# Patient Record
Sex: MALE | Race: WHITE | NOT HISPANIC OR LATINO | Employment: OTHER | URBAN - METROPOLITAN AREA
[De-identification: names, ages, dates, MRNs, and addresses within clinical notes are randomized per-mention and may not be internally consistent; named-entity substitution may affect disease eponyms.]

---

## 2017-02-20 ENCOUNTER — OFFICE VISIT (OUTPATIENT)
Dept: FAMILY MEDICINE | Facility: CLINIC | Age: 32
End: 2017-02-20
Payer: COMMERCIAL

## 2017-02-20 VITALS
TEMPERATURE: 97 F | HEART RATE: 108 BPM | HEIGHT: 70 IN | WEIGHT: 167 LBS | DIASTOLIC BLOOD PRESSURE: 82 MMHG | SYSTOLIC BLOOD PRESSURE: 133 MMHG | BODY MASS INDEX: 23.91 KG/M2

## 2017-02-20 DIAGNOSIS — B02.9 HERPES ZOSTER WITHOUT COMPLICATION: ICD-10-CM

## 2017-02-20 DIAGNOSIS — R10.9 RIGHT FLANK PAIN: Primary | ICD-10-CM

## 2017-02-20 DIAGNOSIS — Z00.01 ENCOUNTER FOR ROUTINE ADULT HEALTH EXAMINATION WITH ABNORMAL FINDINGS: ICD-10-CM

## 2017-02-20 LAB
ALBUMIN SERPL-MCNC: 4.4 G/DL (ref 3.4–5)
ALP SERPL-CCNC: 64 U/L (ref 40–150)
ALT SERPL W P-5'-P-CCNC: 20 U/L (ref 0–70)
ANION GAP SERPL CALCULATED.3IONS-SCNC: 7 MMOL/L (ref 3–14)
AST SERPL W P-5'-P-CCNC: 18 U/L (ref 0–45)
BILIRUB SERPL-MCNC: 4.1 MG/DL (ref 0.2–1.3)
BUN SERPL-MCNC: 17 MG/DL (ref 7–30)
CALCIUM SERPL-MCNC: 9.6 MG/DL (ref 8.5–10.1)
CHLORIDE SERPL-SCNC: 100 MMOL/L (ref 94–109)
CO2 SERPL-SCNC: 29 MMOL/L (ref 20–32)
CREAT SERPL-MCNC: 0.88 MG/DL (ref 0.66–1.25)
GFR SERPL CREATININE-BSD FRML MDRD: ABNORMAL ML/MIN/1.7M2
GLUCOSE SERPL-MCNC: 90 MG/DL (ref 70–99)
POTASSIUM SERPL-SCNC: 4.1 MMOL/L (ref 3.4–5.3)
PROT SERPL-MCNC: 7.9 G/DL (ref 6.8–8.8)
SODIUM SERPL-SCNC: 136 MMOL/L (ref 133–144)

## 2017-02-20 PROCEDURE — 80053 COMPREHEN METABOLIC PANEL: CPT | Performed by: FAMILY MEDICINE

## 2017-02-20 PROCEDURE — 99214 OFFICE O/P EST MOD 30 MIN: CPT | Mod: 25 | Performed by: FAMILY MEDICINE

## 2017-02-20 PROCEDURE — 99395 PREV VISIT EST AGE 18-39: CPT | Performed by: FAMILY MEDICINE

## 2017-02-20 PROCEDURE — 36415 COLL VENOUS BLD VENIPUNCTURE: CPT | Performed by: FAMILY MEDICINE

## 2017-02-20 RX ORDER — TRIAMCINOLONE ACETONIDE 1 MG/G
CREAM TOPICAL
Qty: 15 G | Refills: 0 | Status: SHIPPED | OUTPATIENT
Start: 2017-02-20 | End: 2017-12-12

## 2017-02-20 NOTE — PROGRESS NOTES
SUBJECTIVE:     CC: Keesha Norton is an 31 year old male who presents for preventative health visit.     Healthy Habits:    Do you get at least three servings of calcium containing foods daily (dairy, green leafy vegetables, etc.)? yes    Amount of exercise or daily activities, outside of work: 2 day(s) per week    Problems taking medications regularly No    Medication side effects: No    Have you had an eye exam in the past two years? no    Do you see a dentist twice per year? no    Do you have sleep apnea, excessive snoring or daytime drowsiness?no    Pt is having some type of blister in the right lower leg : now is like a scar    Abdominal/Flank Pain  Duration of complaint: 3 days ago   Description:   Character: Dull ache and Stabbing  Location: right upper quadrant right lower quadrant  Radiation: Back  Intensity: moderate  Progression of Symptoms:  worsening and constant  Accompanying Signs & Symptoms:  Fever/Chills: no  Gas/Bloating: YES  Nausea: YES  Vomitting: YES  Diarrhea: no  Dysuria or Hematuria: no  History:   Trauma: no  Previous similar pain: no   Previous tests done: none  Precipitating factors:   Does the pain change with:     Food: YES     BM: no    Urination: no  Alleviating factors: na  Therapies Tried and outcome: na  LMP:  not applicable    Blisters on right leg and right flank for 1 week.  Associated with runny nose, stuffy nose and cough.  No fever.    Today's PHQ-2 Score: 0  PHQ-2 ( 1999 Pfizer) 2/20/2017 2/19/2017   Q1: Little interest or pleasure in doing things 0 -   Q2: Feeling down, depressed or hopeless 0 -   PHQ-2 Score 0 -   Little interest or pleasure in doing things - Not at all   Feeling down, depressed or hopeless - Not at all   PHQ-2 Score - 0       Abuse: Current or Past(Physical, Sexual or Emotional)- No  Do you feel safe in your environment - Yes    Social History   Substance Use Topics     Smoking status: Never Smoker     Smokeless tobacco: Never Used     Alcohol use Yes  "     Comment: may once a month     The patient does not drink >3 drinks per day nor >7 drinks per week.    Last PSA: No results found for: PSA    Recent Labs   Lab Test  01/06/15   0750   CHOL  160   HDL  52   LDL  92   TRIG  82   CHOLHDLRATIO  3.1       Reviewed orders with patient. Reviewed health maintenance and updated orders accordingly - Yes    All Histories reviewed and updated in Epic.      ROS:  C: NEGATIVE for fever, chills, change in weight  E: NEGATIVE for vision changes or irritation  ENT: NEGATIVE for ear, mouth and throat problems  R: NEGATIVE for significant cough or SOB  CV: NEGATIVE for chest pain, palpitations or peripheral edema   male: negative for dysuria, hematuria, decreased urinary stream, erectile dysfunction, urethral discharge  M: NEGATIVE for significant arthralgias or myalgia  N: NEGATIVE for weakness, dizziness or paresthesias  P: NEGATIVE for changes in mood or affect    Problem list, Medication list, Allergies, and Medical/Social/Surgical histories reviewed in [facility] and updated as appropriate.  OBJECTIVE:     /82 (BP Location: Right arm, Patient Position: Chair, Cuff Size: Adult Regular)  Pulse 108  Temp 97  F (36.1  C) (Oral)  Ht 5' 10\" (1.778 m)  Wt 167 lb (75.8 kg)  BMI 23.96 kg/m2  EXAM:  GENERAL: healthy, alert and no distress  EYES: Eyes grossly normal to inspection, PERRL and conjunctivae and sclerae normal  HENT: ear canals and TM's normal, nose and mouth without ulcers or lesions  NECK: no adenopathy, no asymmetry, masses, or scars and thyroid normal to palpation  RESP: lungs clear to auscultation - no rales, rhonchi or wheezes  CV: regular rate and rhythm, normal S1 S2, no S3 or S4, no murmur, click or rub, no peripheral edema and peripheral pulses strong  ABDOMEN: tenderness LUQ and bowel sounds normal  MS: no gross musculoskeletal defects noted, no edema  SKIN: hyperpigmented macules with healed blisters on right calf  NEURO: Normal strength and tone, mentation " "intact and speech normal  PSYCH: mentation appears normal, affect normal/bright    ASSESSMENT/PLAN:     1. Right flank pain  Check lab and ultrasound.  Offered EGD but refused  - Comprehensive metabolic panel  - US Abdomen Complete; Future    2. Herpes zoster without complication  Healing - topical treatment and monitor for resolution.  - triamcinolone (KENALOG) 0.1 % cream; Apply sparingly to affected area three times daily for 14 days.  Dispense: 15 g; Refill: 0    3. Encounter for routine adult health examination with abnormal findings  Routine preventive discussed.      COUNSELING:  Reviewed preventive health counseling, as reflected in patient instructions    BP Screening:   Last 3 BP Readings:    BP Readings from Last 3 Encounters:   02/20/17 133/82   12/29/15 134/78   09/17/15 105/60       The following was recommended to the patient:  Re-screen BP within a year and recommended lifestyle modifications     reports that he has never smoked. He has never used smokeless tobacco.    Estimated body mass index is 23.96 kg/(m^2) as calculated from the following:    Height as of this encounter: 5' 10\" (1.778 m).    Weight as of this encounter: 167 lb (75.8 kg).       Counseling Resources:  ATP IV Guidelines  Pooled Cohorts Equation Calculator  FRAX Risk Assessment  ICSI Preventive Guidelines  Dietary Guidelines for Americans, 2010  USDA's MyPlate  ASA Prophylaxis  Lung CA Screening    Zainab Fish MD  [facility]  "

## 2017-02-20 NOTE — NURSING NOTE
"Chief Complaint   Patient presents with     Physical     Pt is fasting.        Initial /82 (BP Location: Right arm, Patient Position: Chair, Cuff Size: Adult Regular)  Pulse 108  Temp 97  F (36.1  C) (Oral)  Ht 5' 10\" (1.778 m)  Wt 167 lb (75.8 kg)  BMI 23.96 kg/m2 Estimated body mass index is 23.96 kg/(m^2) as calculated from the following:    Height as of this encounter: 5' 10\" (1.778 m).    Weight as of this encounter: 167 lb (75.8 kg).  Medication Reconciliation: complete   An,CMA      "

## 2017-02-20 NOTE — MR AVS SNAPSHOT
After Visit Summary   2/20/2017    Keesha Norton    MRN: [id number]           Patient Information     Date Of Birth          1985        Visit Information        Provider Department      2/20/2017 11:40 AM Zainab Handy MD [facility]        Today's Diagnoses     Right flank pain    -  1    Herpes zoster without complication          Care Instructions      Preventive Health Recommendations  Male Ages 26 - 39    Yearly exam:             See your health care provider every year in order to  o   Review health changes.   o   Discuss preventive care.    o   Review your medicines if your doctor has prescribed any.    You should be tested each year for STDs (sexually transmitted diseases), if you re at risk.     After age 35, talk to your provider about cholesterol testing. If you are at risk for heart disease, have your cholesterol tested at least every 5 years.     If you are at risk for diabetes, you should have a diabetes test (fasting glucose).  Shots: Get a flu shot each year. Get a tetanus shot every 10 years.     Nutrition:    Eat at least 5 servings of fruits and vegetables daily.     Eat whole-grain bread, whole-wheat pasta and brown rice instead of white grains and rice.     Talk to your provider about Calcium and Vitamin D.     Lifestyle    Exercise for at least 150 minutes a week (30 minutes a day, 5 days a week). This will help you control your weight and prevent disease.     Limit alcohol to one drink per day.     No smoking.     Wear sunscreen to prevent skin cancer.     See your dentist every six months for an exam and cleaning.           Follow-ups after your visit        Your next 10 appointments already scheduled     Feb 21, 2017  1:40 PM MAGED Bates Physical Adult with Lalo John MD   [facility] ([facility]    [address] [place] [id number] [phone]              Future  "tests that were ordered for you today     Open Future Orders        Priority Expected Expires Ordered    US Abdomen Complete Routine 5/21/2017 2/20/2018 2/20/2017            Who to contact     If you have questions or need follow up information about today's clinic visit or your schedule please contact [facility] directly at [phone].  Normal or non-critical lab and imaging results will be communicated to you by MyChart, letter or phone within 4 business days after the clinic has received the results. If you do not hear from us within 7 days, please contact the clinic through Fusion-iohart or phone. If you have a critical or abnormal lab result, we will notify you by phone as soon as possible.  Submit refill requests through Bugsnag or call your pharmacy and they will forward the refill request to us. Please allow 3 business days for your refill to be completed.          Additional Information About Your Visit        Fusion-iohart Information     Bugsnag gives you secure access to your electronic health record. If you see a primary care provider, you can also send messages to your care team and make appointments. If you have questions, please call your primary care clinic.  If you do not have a primary care provider, please call [phone] and they will assist you.        Care EveryWhere ID     This is your Care EveryWhere ID. This could be used by other organizations to access your [place] medical records  [id number]        Your Vitals Were     Pulse Temperature Height BMI (Body Mass Index)          108 97  F (36.1  C) (Oral) 5' 10\" (1.778 m) 23.96 kg/m2         Blood Pressure from Last 3 Encounters:   02/20/17 133/82   12/29/15 134/78   09/17/15 105/60    Weight from Last 3 Encounters:   02/20/17 167 lb (75.8 kg)   12/29/15 169 lb 14.4 oz (77.1 kg)   09/17/15 162 lb 8 oz (73.7 kg)              We Performed the Following     Comprehensive metabolic panel          Today's Medication Changes        "   These changes are accurate as of: 2/20/17 12:13 PM.  If you have any questions, ask your nurse or doctor.               Start taking these medicines.        Dose/Directions    triamcinolone 0.1 % cream   Commonly known as:  KENALOG   Used for:  Herpes zoster without complication   Started by:  Zainab Handy MD        Apply sparingly to affected area three times daily for 14 days.   Quantity:  15 g   Refills:  0            Where to get your medicines      These medications were sent to [facility] [place] - [address]  [address], [facility] [address]     Phone:  [phone]     triamcinolone 0.1 % cream                Primary Care Provider Office Phone # Fax #    Zainab Fish [phone]       [facility] [address]  [facility] [address]        Thank you!     Thank you for choosing [facility]  for your care. Our goal is always to provide you with excellent care. Hearing back from our patients is one way we can continue to improve our services. Please take a few minutes to complete the written survey that you may receive in the mail after your visit with us. Thank you!             Your Updated Medication List - Protect others around you: Learn how to safely use, store and throw away your medicines at www.disposemymeds.org.          This list is accurate as of: 2/20/17 12:13 PM.  Always use your most recent med list.                   Brand Name Dispense Instructions for use    triamcinolone 0.1 % cream    KENALOG    15 g    Apply sparingly to affected area three times daily for 14 days.

## 2017-02-21 ENCOUNTER — RADIANT APPOINTMENT (OUTPATIENT)
Dept: ULTRASOUND IMAGING | Facility: CLINIC | Age: 32
End: 2017-02-21
Attending: FAMILY MEDICINE
Payer: COMMERCIAL

## 2017-02-21 DIAGNOSIS — R10.9 RIGHT FLANK PAIN: ICD-10-CM

## 2017-02-21 PROCEDURE — 76700 US EXAM ABDOM COMPLETE: CPT

## 2017-02-21 NOTE — PROGRESS NOTES
Mr. Norton,    Your abdominal ultrasound was normal.  No signs of any liver issues.    Please contact the clinic if you have additional questions.  Thank you.    Sincerely,    Zainab Fish

## 2017-02-23 NOTE — PROGRESS NOTES
Mr. Norton,    Your liver and kidney tests are normal.    Please contact the clinic if you have additional questions.  Thank you.    Sincerely,    Zainab Fish

## 2017-11-07 ENCOUNTER — NURSE TRIAGE (OUTPATIENT)
Dept: NURSING | Facility: CLINIC | Age: 32
End: 2017-11-07

## 2017-11-08 NOTE — TELEPHONE ENCOUNTER
"\"AJ\" as he said his name was, stepped on a clinton nail.  He wonders if he needs a tetanus shot.  His last tetanus shot was in 2010. I instructed he get a tetanus shot within 72 hours of the injury.  He'll make an appt on line.  Mansi CANNON RN [facility]     "

## 2017-11-27 ENCOUNTER — OFFICE VISIT (OUTPATIENT)
Dept: URGENT CARE | Facility: URGENT CARE | Age: 32
End: 2017-11-27
Payer: COMMERCIAL

## 2017-11-27 VITALS
TEMPERATURE: 98.8 F | BODY MASS INDEX: 23.62 KG/M2 | OXYGEN SATURATION: 99 % | HEART RATE: 76 BPM | DIASTOLIC BLOOD PRESSURE: 72 MMHG | SYSTOLIC BLOOD PRESSURE: 121 MMHG | WEIGHT: 164.6 LBS

## 2017-11-27 DIAGNOSIS — Z23 NEED FOR VACCINATION: ICD-10-CM

## 2017-11-27 DIAGNOSIS — R82.90 CLOUDY URINE: ICD-10-CM

## 2017-11-27 DIAGNOSIS — M54.42 ACUTE LEFT-SIDED LOW BACK PAIN WITH LEFT-SIDED SCIATICA: Primary | ICD-10-CM

## 2017-11-27 LAB
ALBUMIN UR-MCNC: NEGATIVE MG/DL
APPEARANCE UR: CLEAR
BACTERIA #/AREA URNS HPF: ABNORMAL /HPF
BASOPHILS # BLD AUTO: 0 10E9/L (ref 0–0.2)
BASOPHILS NFR BLD AUTO: 0.1 %
BILIRUB UR QL STRIP: NEGATIVE
COLOR UR AUTO: YELLOW
DIFFERENTIAL METHOD BLD: NORMAL
EOSINOPHIL # BLD AUTO: 0.1 10E9/L (ref 0–0.7)
EOSINOPHIL NFR BLD AUTO: 1.5 %
ERYTHROCYTE [DISTWIDTH] IN BLOOD BY AUTOMATED COUNT: 12.4 % (ref 10–15)
GLUCOSE BLD-MCNC: 73 MG/DL (ref 70–99)
GLUCOSE UR STRIP-MCNC: NEGATIVE MG/DL
HCT VFR BLD AUTO: 44.6 % (ref 40–53)
HGB BLD-MCNC: 15.6 G/DL (ref 13.3–17.7)
HGB UR QL STRIP: ABNORMAL
KETONES UR STRIP-MCNC: NEGATIVE MG/DL
LEUKOCYTE ESTERASE UR QL STRIP: NEGATIVE
LYMPHOCYTES # BLD AUTO: 1.9 10E9/L (ref 0.8–5.3)
LYMPHOCYTES NFR BLD AUTO: 25.4 %
MCH RBC QN AUTO: 30.5 PG (ref 26.5–33)
MCHC RBC AUTO-ENTMCNC: 35 G/DL (ref 31.5–36.5)
MCV RBC AUTO: 87 FL (ref 78–100)
MONOCYTES # BLD AUTO: 0.6 10E9/L (ref 0–1.3)
MONOCYTES NFR BLD AUTO: 8.3 %
NEUTROPHILS # BLD AUTO: 4.9 10E9/L (ref 1.6–8.3)
NEUTROPHILS NFR BLD AUTO: 64.7 %
NITRATE UR QL: NEGATIVE
PH UR STRIP: 6 PH (ref 5–7)
PLATELET # BLD AUTO: 232 10E9/L (ref 150–450)
RBC # BLD AUTO: 5.12 10E12/L (ref 4.4–5.9)
RBC #/AREA URNS AUTO: ABNORMAL /HPF
SOURCE: ABNORMAL
SP GR UR STRIP: <=1.005 (ref 1–1.03)
UROBILINOGEN UR STRIP-ACNC: 0.2 EU/DL (ref 0.2–1)
WBC # BLD AUTO: 7.5 10E9/L (ref 4–11)
WBC #/AREA URNS AUTO: ABNORMAL /HPF

## 2017-11-27 PROCEDURE — 81001 URINALYSIS AUTO W/SCOPE: CPT | Performed by: PHYSICIAN ASSISTANT

## 2017-11-27 PROCEDURE — 90471 IMMUNIZATION ADMIN: CPT | Performed by: PHYSICIAN ASSISTANT

## 2017-11-27 PROCEDURE — 90715 TDAP VACCINE 7 YRS/> IM: CPT | Performed by: PHYSICIAN ASSISTANT

## 2017-11-27 PROCEDURE — 82947 ASSAY GLUCOSE BLOOD QUANT: CPT | Mod: 59 | Performed by: PHYSICIAN ASSISTANT

## 2017-11-27 PROCEDURE — 80048 BASIC METABOLIC PNL TOTAL CA: CPT | Performed by: PHYSICIAN ASSISTANT

## 2017-11-27 PROCEDURE — 85025 COMPLETE CBC W/AUTO DIFF WBC: CPT | Performed by: PHYSICIAN ASSISTANT

## 2017-11-27 PROCEDURE — 99214 OFFICE O/P EST MOD 30 MIN: CPT | Mod: 25 | Performed by: PHYSICIAN ASSISTANT

## 2017-11-27 PROCEDURE — 87086 URINE CULTURE/COLONY COUNT: CPT | Performed by: PHYSICIAN ASSISTANT

## 2017-11-27 PROCEDURE — 36415 COLL VENOUS BLD VENIPUNCTURE: CPT | Performed by: PHYSICIAN ASSISTANT

## 2017-11-27 RX ORDER — CIPROFLOXACIN 250 MG/1
250 TABLET, FILM COATED ORAL 2 TIMES DAILY
Qty: 6 TABLET | Refills: 0 | Status: SHIPPED | OUTPATIENT
Start: 2017-11-27 | End: 2017-12-12

## 2017-11-27 NOTE — MR AVS SNAPSHOT
After Visit Summary   11/27/2017    Keesha Norton    MRN: [id number]           Patient Information     Date Of Birth          1985        Visit Information        Provider Department      11/27/2017 5:00 PM [facility]        Today's Diagnoses     Acute left-sided low back pain with left-sided sciatica    -  1    Cloudy urine        Need for vaccination           Follow-ups after your visit        Who to contact     If you have questions or need follow up information about today's clinic visit or your schedule please contact [facility] directly at [phone].  Normal or non-critical lab and imaging results will be communicated to you by MyChart, letter or phone within 4 business days after the clinic has received the results. If you do not hear from us within 7 days, please contact the clinic through [employer] Continuityhart or phone. If you have a critical or abnormal lab result, we will notify you by phone as soon as possible.  Submit refill requests through OutTrippin or call your pharmacy and they will forward the refill request to us. Please allow 3 business days for your refill to be completed.          Additional Information About Your Visit        MyChart Information     OutTrippin gives you secure access to your electronic health record. If you see a primary care provider, you can also send messages to your care team and make appointments. If you have questions, please call your primary care clinic.  If you do not have a primary care provider, please call [phone] and they will assist you.        Care EveryWhere ID     This is your Care EveryWhere ID. This could be used by other organizations to access your [place] medical records  [id number]        Your Vitals Were     Pulse Temperature Pulse Oximetry BMI (Body Mass Index)          76 98.8  F (37.1  C) (Oral) 99% 23.62 kg/m2         Blood Pressure from Last 3 Encounters:   11/27/17  121/72   02/20/17 133/82   12/29/15 134/78    Weight from Last 3 Encounters:   11/27/17 164 lb 9.6 oz (74.7 kg)   02/20/17 167 lb (75.8 kg)   12/29/15 169 lb 14.4 oz (77.1 kg)              We Performed the Following     *UA reflex to Microscopic and Culture ([place] and [facility] (except [place] and [place])     Basic metabolic panel  (Ca, Cl, CO2, Creat, Gluc, K, Na, BUN)     CBC with platelets differential     Glucose, whole blood     TDAP VACCINE (ADACEL)     Urine Culture Aerobic Bacterial     Urine Microscopic          Today's Medication Changes          These changes are accurate as of: 11/27/17  7:25 PM.  If you have any questions, ask your nurse or doctor.               Start taking these medicines.        Dose/Directions    ciprofloxacin 250 MG tablet   Commonly known as:  CIPRO   Used for:  Cloudy urine   Started by:  Sue Law PA-C        Dose:  250 mg   Take 1 tablet (250 mg) by mouth 2 times daily   Quantity:  6 tablet   Refills:  0            Where to get your medicines      These medications were sent to [facility] [place] - [place], [place] - [address]  [address], [facility] [address]     Phone:  [phone]     ciprofloxacin 250 MG tablet                Primary Care Provider Office Phone # Fax #    Zainab Lrbalaji Fish [phone]       [address]  [facility] [address]        Equal Access to Services     [facility] : Hadii aad ku hadasho Soomaali, waaxda luqadaha, qaybta kaalmada adeegyada, jeffery ramirez hayjonna hawley . So [facility] [phone].    ATENCIÓN: Si habla español, tiene a [place] disposición servicios gratuitos de asistencia lingüística. Llame al [phone].    We comply with applicable federal civil rights laws and [place] laws. We do not discriminate on the basis of race, color, national origin, age, disability, sex, sexual orientation, or gender identity.            Thank you!     Thank  you for choosing [facility]  for your care. Our goal is always to provide you with excellent care. Hearing back from our patients is one way we can continue to improve our services. Please take a few minutes to complete the written survey that you may receive in the mail after your visit with us. Thank you!             Your Updated Medication List - Protect others around you: Learn how to safely use, store and throw away your medicines at www.disposemymeds.org.          This list is accurate as of: 11/27/17  7:25 PM.  Always use your most recent med list.                   Brand Name Dispense Instructions for use Diagnosis    ciprofloxacin 250 MG tablet    CIPRO    6 tablet    Take 1 tablet (250 mg) by mouth 2 times daily    Cloudy urine       triamcinolone 0.1 % cream    KENALOG    15 g    Apply sparingly to affected area three times daily for 14 days.    Herpes zoster without complication

## 2017-11-27 NOTE — PROGRESS NOTES
SUBJECTIVE:   Keesha Norton is a 32 year old male who presents to clinic today for the following health issues:      Back pain      Duration: 3-4 days    Description (location/character/radiation): lower back    Intensity:  moderate    Accompanying signs and symptoms: back pain, dizziness, cloudy urine    History (similar episodes/previous evaluation): None    Precipitating or alleviating factors: None    Therapies tried and outcome: cranberry juice   No injury   Also stepped on a nail 20 days ago, r big toe. Healed but worried about tetanus. Last tetanus 2010  Left LBP with some tingling in left thigh.  Dizziness when back pain occurs. Has tried to drink lots of water. No dysuria. . No STD concerns        Allergies   Allergen Reactions     Seasonal Allergies        Past Medical History:   Diagnosis Date     Gastric ulcer 2015     Helicobacter pylori (H. pylori) infection          Current Outpatient Prescriptions on File Prior to Visit:  triamcinolone (KENALOG) 0.1 % cream Apply sparingly to affected area three times daily for 14 days.     No current facility-administered medications on file prior to visit.     Social History   Substance Use Topics     Smoking status: Never Smoker     Smokeless tobacco: Never Used     Alcohol use Yes      Comment: may once a month       ROS:  General: negative for fever  ABD: Denies abd pain  : as above  No HA.    OBJECTIVE:  /72 (BP Location: Left arm, Patient Position: Chair, Cuff Size: Adult Regular)  Pulse 76  Temp 98.8  F (37.1  C) (Oral)  Wt 164 lb 9.6 oz (74.7 kg)  SpO2 99%  BMI 23.62 kg/m2   General:   awake, alert, and cooperative.  NAD.   Head: Normocephalic, atraumatic.  Eyes: Conjunctiva clear, non icteric.   ABD: soft, no tenderness to palpation , no rigidity, guarding or rebound . No CVAT  Neuro: Alert and oriented - normal speech.   Back- NT. FROM without pain. Reflexes LE symmetric and physiologic.  Supine SLR- Negative bilaterally. Neg.  Raleigh's test bilaterally.  Results for orders placed or performed in visit on 11/27/17   *UA reflex to Microscopic and Culture ([place] and [facility] (except [place] and [place])   Result Value Ref Range    Color Urine Yellow     Appearance Urine Clear     Glucose Urine Negative NEG^Negative mg/dL    Bilirubin Urine Negative NEG^Negative    Ketones Urine Negative NEG^Negative mg/dL    Specific Gravity Urine <=1.005 1.003 - 1.035    Blood Urine Trace (A) NEG^Negative    pH Urine 6.0 5.0 - 7.0 pH    Protein Albumin Urine Negative NEG^Negative mg/dL    Urobilinogen Urine 0.2 0.2 - 1.0 EU/dL    Nitrite Urine Negative NEG^Negative    Leukocyte Esterase Urine Negative NEG^Negative    Source Midstream Urine    CBC with platelets differential   Result Value Ref Range    WBC 7.5 4.0 - 11.0 10e9/L    RBC Count 5.12 4.4 - 5.9 10e12/L    Hemoglobin 15.6 13.3 - 17.7 g/dL    Hematocrit 44.6 40.0 - 53.0 %    MCV 87 78 - 100 fl    MCH 30.5 26.5 - 33.0 pg    MCHC 35.0 31.5 - 36.5 g/dL    RDW 12.4 10.0 - 15.0 %    Platelet Count 232 150 - 450 10e9/L    Diff Method Automated Method     % Neutrophils 64.7 %    % Lymphocytes 25.4 %    % Monocytes 8.3 %    % Eosinophils 1.5 %    % Basophils 0.1 %    Absolute Neutrophil 4.9 1.6 - 8.3 10e9/L    Absolute Lymphocytes 1.9 0.8 - 5.3 10e9/L    Absolute Monocytes 0.6 0.0 - 1.3 10e9/L    Absolute Eosinophils 0.1 0.0 - 0.7 10e9/L    Absolute Basophils 0.0 0.0 - 0.2 10e9/L   Glucose, whole blood   Result Value Ref Range    Glucose Whole Blood 73 70 - 99 mg/dL   Urine Microscopic   Result Value Ref Range    WBC Urine O - 2 OTO2^O - 2 /HPF    RBC Urine O - 2 OTO2^O - 2 /HPF    Bacteria Urine Few (A) NEG^Negative /HPF         ASSESSMENT:    ICD-10-CM    1. Acute left-sided low back pain with left-sided sciatica M54.42 Basic metabolic panel  (Ca, Cl, CO2, Creat, Gluc, K, Na, BUN)     CBC with platelets differential     Glucose, whole blood     Urine Microscopic   2. Cloudy urine R82.90 *UA  reflex to Microscopic and Culture ([place] and [facility] (except [place] and [place])     Glucose, whole blood     Urine Culture Aerobic Bacterial     ciprofloxacin (CIPRO) 250 MG tablet   3. Need for vaccination Z23 TDAP VACCINE (ADACEL)           PLAN: Unclear etiology. If kidney unction is normal try OTC IBU. Urine showed a few bacteria. ? Urethritis. Will try some Cipro. UC pending.  As per ordered above.   Sue Law PA-C

## 2017-11-27 NOTE — NURSING NOTE
"Chief Complaint   Patient presents with     Back Pain     Patient complains of back pain       Initial /72 (BP Location: Left arm, Patient Position: Chair, Cuff Size: Adult Regular)  Pulse 76  Temp 98.8  F (37.1  C) (Oral)  Wt 164 lb 9.6 oz (74.7 kg)  SpO2 99%  BMI 23.62 kg/m2 Estimated body mass index is 23.62 kg/(m^2) as calculated from the following:    Height as of 2/20/17: 5' 10\" (1.778 m).    Weight as of this encounter: 164 lb 9.6 oz (74.7 kg).  Medication Reconciliation: complete       No Barraza    "

## 2017-11-27 NOTE — LETTER
[facility]  [address] [facility] [address]  Phone: [phone]   November 29, 2017      Keesha Norton  [address] [place] [id number]            Dear Keesha Norton    Urine culture showed no growth. Follow up with Primary if no resolution of symptoms.    Enclosed is a copy of the results.  It was a pleasure to see you at your last visit.    If you have any questions or concerns, please call the clinic at [phone].      Sincerely,      Sue Law PA-C/ ORLY BORDEN      Results for orders placed or performed in visit on 11/27/17   *UA reflex to Microscopic and Culture ([facility] (except [place] and [place])   Result Value Ref Range    Color Urine Yellow     Appearance Urine Clear     Glucose Urine Negative NEG^Negative mg/dL    Bilirubin Urine Negative NEG^Negative    Ketones Urine Negative NEG^Negative mg/dL    Specific Gravity Urine <=1.005 1.003 - 1.035    Blood Urine Trace (A) NEG^Negative    pH Urine 6.0 5.0 - 7.0 pH    Protein Albumin Urine Negative NEG^Negative mg/dL    Urobilinogen Urine 0.2 0.2 - 1.0 EU/dL    Nitrite Urine Negative NEG^Negative    Leukocyte Esterase Urine Negative NEG^Negative    Source Midstream Urine    Basic metabolic panel  (Ca, Cl, CO2, Creat, Gluc, K, Na, BUN)   Result Value Ref Range    Sodium 139 133 - 144 mmol/L    Potassium 4.0 3.4 - 5.3 mmol/L    Chloride 104 94 - 109 mmol/L    Carbon Dioxide 30 20 - 32 mmol/L    Anion Gap 5 3 - 14 mmol/L    Glucose 78 70 - 99 mg/dL    Urea Nitrogen 15 7 - 30 mg/dL    Creatinine 0.90 0.66 - 1.25 mg/dL    GFR Estimate >90 >60 mL/min/1.7m2    GFR Estimate If Black >90 >60 mL/min/1.7m2    Calcium 9.3 8.5 - 10.1 mg/dL   CBC with platelets differential   Result Value Ref Range    WBC 7.5 4.0 - 11.0 10e9/L    RBC Count 5.12 4.4 - 5.9 10e12/L    Hemoglobin 15.6 13.3 - 17.7 g/dL    Hematocrit 44.6 40.0 - 53.0 %    MCV 87 78 - 100 fl    MCH 30.5 26.5 - 33.0 pg    MCHC 35.0  31.5 - 36.5 g/dL    RDW 12.4 10.0 - 15.0 %    Platelet Count 232 150 - 450 10e9/L    Diff Method Automated Method     % Neutrophils 64.7 %    % Lymphocytes 25.4 %    % Monocytes 8.3 %    % Eosinophils 1.5 %    % Basophils 0.1 %    Absolute Neutrophil 4.9 1.6 - 8.3 10e9/L    Absolute Lymphocytes 1.9 0.8 - 5.3 10e9/L    Absolute Monocytes 0.6 0.0 - 1.3 10e9/L    Absolute Eosinophils 0.1 0.0 - 0.7 10e9/L    Absolute Basophils 0.0 0.0 - 0.2 10e9/L   Glucose, whole blood   Result Value Ref Range    Glucose Whole Blood 73 70 - 99 mg/dL   Urine Microscopic   Result Value Ref Range    WBC Urine O - 2 OTO2^O - 2 /HPF    RBC Urine O - 2 OTO2^O - 2 /HPF    Bacteria Urine Few (A) NEG^Negative /HPF   Urine Culture Aerobic Bacterial   Result Value Ref Range    Specimen Description Midstream Urine     Culture Micro No growth

## 2017-11-28 LAB
ANION GAP SERPL CALCULATED.3IONS-SCNC: 5 MMOL/L (ref 3–14)
BACTERIA SPEC CULT: NO GROWTH
BUN SERPL-MCNC: 15 MG/DL (ref 7–30)
CALCIUM SERPL-MCNC: 9.3 MG/DL (ref 8.5–10.1)
CHLORIDE SERPL-SCNC: 104 MMOL/L (ref 94–109)
CO2 SERPL-SCNC: 30 MMOL/L (ref 20–32)
CREAT SERPL-MCNC: 0.9 MG/DL (ref 0.66–1.25)
GFR SERPL CREATININE-BSD FRML MDRD: >90 ML/MIN/1.7M2
GLUCOSE SERPL-MCNC: 78 MG/DL (ref 70–99)
POTASSIUM SERPL-SCNC: 4 MMOL/L (ref 3.4–5.3)
SODIUM SERPL-SCNC: 139 MMOL/L (ref 133–144)
SPECIMEN SOURCE: NORMAL

## 2017-11-28 NOTE — NURSING NOTE
The following medication was given:     MEDICATION: TETANUS  ROUTE: IM  SITE: Arm - Right  DOSE: 0.5 ML  LOT #: [id number]  :  [employer]  EXPIRATION DATE:  09/22/2019  NDC#: 26601-941-93      No Barraza

## 2017-11-29 ENCOUNTER — TELEPHONE (OUTPATIENT)
Dept: URGENT CARE | Facility: URGENT CARE | Age: 32
End: 2017-11-29

## 2017-11-29 NOTE — TELEPHONE ENCOUNTER
Reason for Call:  Request for results:    Name of test or procedure: Urine labs    Date of test of procedure: 11/27/2017    Location of the test or procedure: [facility]    OK to leave the result message on voice mail or with a family member? YES    Phone number Patient can be reached at:  Home number on file [phone] (home)    Additional comments: none     Call taken on 11/29/2017 at 9:26 AM by Maureen May

## 2017-11-29 NOTE — TELEPHONE ENCOUNTER
Spoke with patient. Relayed results. Verbalized understanding.    Sheila Bragg RN   [facility]

## 2017-12-12 ENCOUNTER — OFFICE VISIT (OUTPATIENT)
Dept: FAMILY MEDICINE | Facility: CLINIC | Age: 32
End: 2017-12-12
Payer: COMMERCIAL

## 2017-12-12 VITALS
TEMPERATURE: 97.8 F | WEIGHT: 158.3 LBS | SYSTOLIC BLOOD PRESSURE: 113 MMHG | HEART RATE: 64 BPM | BODY MASS INDEX: 22.71 KG/M2 | DIASTOLIC BLOOD PRESSURE: 69 MMHG

## 2017-12-12 DIAGNOSIS — N45.1 EPIDIDYMITIS: Primary | ICD-10-CM

## 2017-12-12 DIAGNOSIS — K21.9 GASTROESOPHAGEAL REFLUX DISEASE WITHOUT ESOPHAGITIS: ICD-10-CM

## 2017-12-12 LAB
ALBUMIN UR-MCNC: NEGATIVE MG/DL
APPEARANCE UR: CLEAR
BILIRUB UR QL STRIP: NEGATIVE
COLOR UR AUTO: YELLOW
GLUCOSE UR STRIP-MCNC: NEGATIVE MG/DL
HGB UR QL STRIP: ABNORMAL
KETONES UR STRIP-MCNC: NEGATIVE MG/DL
LEUKOCYTE ESTERASE UR QL STRIP: NEGATIVE
MUCOUS THREADS #/AREA URNS LPF: PRESENT /LPF
NITRATE UR QL: NEGATIVE
NON-SQ EPI CELLS #/AREA URNS LPF: ABNORMAL /LPF
PH UR STRIP: 6 PH (ref 5–7)
RBC #/AREA URNS AUTO: ABNORMAL /HPF
SOURCE: ABNORMAL
SP GR UR STRIP: 1.02 (ref 1–1.03)
UROBILINOGEN UR STRIP-ACNC: 0.2 EU/DL (ref 0.2–1)
WBC #/AREA URNS AUTO: ABNORMAL /HPF

## 2017-12-12 PROCEDURE — 99214 OFFICE O/P EST MOD 30 MIN: CPT | Performed by: PHYSICIAN ASSISTANT

## 2017-12-12 PROCEDURE — 81001 URINALYSIS AUTO W/SCOPE: CPT | Performed by: PHYSICIAN ASSISTANT

## 2017-12-12 PROCEDURE — 87491 CHLMYD TRACH DNA AMP PROBE: CPT | Performed by: PHYSICIAN ASSISTANT

## 2017-12-12 PROCEDURE — 87591 N.GONORRHOEAE DNA AMP PROB: CPT | Performed by: PHYSICIAN ASSISTANT

## 2017-12-12 RX ORDER — SULFAMETHOXAZOLE/TRIMETHOPRIM 800-160 MG
1 TABLET ORAL 2 TIMES DAILY
Qty: 28 TABLET | Refills: 0 | Status: SHIPPED | OUTPATIENT
Start: 2017-12-12 | End: 2017-12-26

## 2017-12-12 NOTE — PATIENT INSTRUCTIONS
Please take Bactrim 1 tablet twice a day  twice a day with large amount of water  Increase fluids    Zantac 150 mg twice a day as needed for burning in stomach after eating  Discharge Instructions for Epididymitis  You have been diagnosed with epididymitis. This is an inflammation of a coiled tube called the epididymis that is located behind your testicle, inside the scrotum. The epididymis collects and stores sperm made by the testicles. Epididymitis is often caused by bacteria in the urinary tract or by bacteria passed between partners during sex. It can also be a complication of certain hospital procedures, or it can be caused by use of a urinary catheter. Here s what you need to do at home to care for yourself.  Home care    Be sure to finish all the medicine your healthcare provider prescribed -- even if you feel better. Not finishing the medicine can make your condition harder to treat or cause the condition to come back.    Rest in bed if you are uncomfortable. Discomfort should go away within 1 to 3 days of treatment. Swelling may persist for up to 2 months.    Ask your healthcare provider about pain medicine to keep you comfortable.    Use an ice pack or bag of frozen peas to help relieve the pain. Wrap the peas or ice pack in a thin cloth and apply to the area.    Don t leave the ice pack on your skin for longer than 20 minutes, and don t use it more often than once every hour.    Elevate the scrotum with a rolled-up towel when you are resting.    For the first few days, wear an athletic supporter. When your pain subsides, wear briefs instead of boxers to better support the scrotum. This can help relieve pain.    Keep your penis and scrotum clean.    Use a condom to protect against sexually transmitted diseases (STDs).    If your condition was caused by an STD, be sure to inform your sexual partner(s).  Follow-up care  Make a follow-up appointment or as directed by your healthcare provider.  When to call  your healthcare provider  Call your healthcare provider right away if you have any of the following:    Increased pain or swelling in the scrotum    Frequent urge or inability to urinate    Discharge from the penis    Pain during ejaculation    Fever above 100.4 F (38 C)   Date Last Reviewed: 1/1/2017 2000-2017 [employer]. [address] [place] [address]. All rights reserved. This information is not intended as a substitute for professional medical care. Always follow your healthcare professional's instructions.        What are Epididymitis and Orchitis?    The epididymis is a coiled tube. It is part of the male reproductive system. A man s two testicles sit inside the scrotum. One epididymis sits behind each testicle. Epididymitis is inflammation (swelling and irritation) of this tube. Orchitis occurs when the inflammation spreads to the testicle.  Normal flow of sperm and urine  Sperm are made in the testicles. Sperm travel from the testicles through the epididymis. They flow into a tube called the vas deferens. During ejaculation, sperm pass from the vas deferens through the urethra out of the body. During urination, urine flows from the bladder through the urethra out of the body.  How the problem starts  The problem is thought to be due to bacteria. If bacteria get into the urethra, they may cause an infection. Infection can then travel up the urethra into the epididymis. This leads to inflammation. Pain and swelling can then spread to the testicle. This is called orchitis. In rare cases, orchitis can be due to an infection with the mumps virus. There are two kinds of inflammation:    Acute inflammation comes on quickly. Symptoms may include:    Pain and swelling in the scrotum    Frequent urge to urinate    Discharge from the penis    Pain during ejaculation    Fever    Chronic inflammation is most often the late phase of an acute infection. Symptoms may include:    An ache or dull  pain in the scrotum, which may spread to the groin    A heavy feeling in the scrotum  Date Last Reviewed: 1/1/2017 2000-2017 [employer]. [address] [place] [address]. All rights reserved. This information is not intended as a substitute for professional medical care. Always follow your healthcare professional's instructions.        Tips to Control Acid Reflux    To control acid reflux, you ll need to make some basic diet and lifestyle changes. The simple steps outlined below may be all you ll need to ease discomfort.  Watch what you eat    Avoid fatty foods and spicy foods.    Eat fewer acidic foods, such as citrus and tomato-based foods. These can increase symptoms.    Limit drinking alcohol, caffeine, and fizzy beverages. All increase acid reflux.    Try limiting chocolate, peppermint, and spearmint. These can worsen acid reflux in some people.  Watch when you eat    Avoid lying down for 3 hours after eating.    Do not snack before going to bed.  Raise your head  Raising your head and upper body by 4 to 6 inches helps limit reflux when you re lying down. Put blocks under the head of your bed frame to raise it.  Other changes    Lose weight, if you need to    Don t exercise near bedtime    Avoid tight-fitting clothes    Limit aspirin and ibuprofen    Stop smoking   Date Last Reviewed: 7/1/2016 2000-2017 [employer]. [address] [place] [address]. All rights reserved. This information is not intended as a substitute for professional medical care. Always follow your healthcare professional's instructions.

## 2017-12-12 NOTE — NURSING NOTE
"Chief Complaint   Patient presents with     Back Pain     Lower back pain for about 3.5 weeks       Initial /69 (BP Location: Right arm, Patient Position: Chair, Cuff Size: Adult Regular)  Pulse 64  Temp 97.8  F (36.6  C) (Oral)  Wt 158 lb 4.8 oz (71.8 kg)  BMI 22.71 kg/m2 Estimated body mass index is 22.71 kg/(m^2) as calculated from the following:    Height as of 2/20/17: 5' 10\" (1.778 m).    Weight as of this encounter: 158 lb 4.8 oz (71.8 kg).  Medication Reconciliation: complete   Terri Mireles MA      "

## 2017-12-12 NOTE — PROGRESS NOTES
SUBJECTIVE:   Keesha Norton is a 32 year old male who presents to clinic today for the following health issues:    Genitourinary symptoms      Duration: 3 weeks    Description:  dysuria and pain in testicle    Intensity:  mild    Accompanying signs and symptoms (fever/discharge/nausea/vomiting/back or abdominal pain):  occasional burning pain that comes from groin and up on left side of the stomach and flank    History (frequent UTI's/kidney stones/prostate problems): None  Sexually active: YES    Precipitating or alleviating factors: None    Therapies tried and outcome: Cipro for 3 days 2 weeks ago   Outcome: helped slightly    GERD/Heartburn      Duration: on and off    Description (location/character/radiation): epigastric burning from acidic foods: orange or cranberry juice    Intensity:  mild    Accompanying signs and symptoms:  food getting stuck: no   nausea/vomiting/blood: no   abdominal pain: no   black/tarry or bloody stools: no :    History (similar episodes/previous evaluation): None    Precipitating or alleviating factors:  worse with spicy foods and acidic food.  current NSAID/Aspirin use: no     Therapies tried and outcome: none      Problem list and histories reviewed & adjusted, as indicated.  Additional history: as documented    Patient Active Problem List   Diagnosis     CARDIOVASCULAR SCREENING; LDL GOAL LESS THAN 160     Vitamin D insufficiency     Elevated bilirubin     GERD (gastroesophageal reflux disease)     Gilbert's disease     Herpes zoster without complication     Right flank pain     Past Surgical History:   Procedure Laterality Date     ESOPHAGOSCOPY, GASTROSCOPY, DUODENOSCOPY (EGD), COMBINED N/A 2/13/2015    Procedure: COMBINED ESOPHAGOSCOPY, GASTROSCOPY, DUODENOSCOPY (EGD);  Surgeon: Jair Dickinson MD;  Location:  OR     ESOPHAGOSCOPY, GASTROSCOPY, DUODENOSCOPY (EGD), COMBINED Left 2/13/2015    Procedure: COMBINED ESOPHAGOSCOPY, GASTROSCOPY, DUODENOSCOPY (EGD),  BIOPSY SINGLE OR MULTIPLE;  Surgeon: Jair Dickinson MD;  Location: [facility] OR     ESOPHAGOSCOPY, GASTROSCOPY, DUODENOSCOPY (EGD), COMBINED N/A 4/6/2015    Procedure: COMBINED ESOPHAGOSCOPY, GASTROSCOPY, DUODENOSCOPY (EGD);  Surgeon: Arsen Bashir MD;  Location: [facility] OR     HC TOOTH EXTRACTION W/FORCEP         Social History   Substance Use Topics     Smoking status: Never Smoker     Smokeless tobacco: Never Used     Alcohol use Yes      Comment: may once a month     Family History   Problem Relation Age of Onset     Hypertension Mother      Prostate Cancer Maternal Grandfather      Coronary Artery Disease Maternal Grandfather      CEREBROVASCULAR DISEASE Paternal Grandmother      Asthma Maternal Uncle      DIABETES Paternal Aunt      Cancer - colorectal No family hx of      Breast Cancer No family hx of      Thyroid Disease No family hx of      CANCER No family hx of      Glaucoma No family hx of      Macular Degeneration No family hx of      Hyperlipidemia No family hx of      Ovarian Cancer No family hx of      Thyroid Disease No family hx of          Current Outpatient Prescriptions   Medication Sig Dispense Refill     sulfamethoxazole-trimethoprim (BACTRIM DS/SEPTRA DS) 800-160 MG per tablet Take 1 tablet by mouth 2 times daily for 14 days 28 tablet 0     ranitidine (ZANTAC) 150 MG tablet Take 1 tablet (150 mg) by mouth 2 times daily 60 tablet 1     Allergies   Allergen Reactions     Seasonal Allergies          Reviewed and updated as needed this visit by clinical staffTobacco  Allergies  Meds  Med Hx  Surg Hx  Soc Hx      Reviewed and updated as needed this visit by Provider         ROS:  Constitutional, HEENT, cardiovascular, pulmonary, GI, , musculoskeletal, neuro, skin, endocrine and psych systems are negative, except as otherwise noted.      OBJECTIVE:   /69 (BP Location: Right arm, Patient Position: Chair, Cuff Size: Adult Regular)  Pulse 64  Temp 97.8  F (36.6  C) (Oral)  Wt 158 lb  4.8 oz (71.8 kg)  BMI 22.71 kg/m2  Body mass index is 22.71 kg/(m^2).  GENERAL: healthy, alert and no distress  EYES: Eyes grossly normal to inspection, PERRL and conjunctivae and sclerae normal  HENT: ear canals and TM's normal, nose and mouth without ulcers or lesions  NECK: no adenopathy, no asymmetry, masses, or scars and thyroid normal to palpation  RESP: lungs clear to auscultation - no rales, rhonchi or wheezes  CV: regular rate and rhythm, normal S1 S2, no S3 or S4, no murmur, click or rub, no peripheral edema and peripheral pulses strong  ABDOMEN: soft, nontender, no hepatosplenomegaly, no masses and bowel sounds normal   (male): testicles normal without atrophy or masses, no hernias, penis normal without urethral discharge and tenderness to palpation left epididymis  MS: no gross musculoskeletal defects noted, no edema  BACK: no CVA tenderness, no paralumbar tenderness    Diagnostic Test Results:  No results found for this or any previous visit (from the past 24 hour(s)).    ASSESSMENT/PLAN:       ICD-10-CM    1. Epididymitis N45.1 sulfamethoxazole-trimethoprim (BACTRIM DS/SEPTRA DS) 800-160 MG per tablet     UA with Microscopic     NEISSERIA GONORRHOEA PCR     CHLAMYDIA TRACHOMATIS PCR   2. Gastroesophageal reflux disease without esophagitis K21.9 ranitidine (ZANTAC) 150 MG tablet     1. Since Cipro has helped 2 weeks ago, but caused muscle and tendon pain in arms and legs patient will be switched to Bactrim  Please take Bactrim 1 tablet twice a day  twice a day with large amount of water  Increase fluids  STDs are pending   urinalysis is pending.   2. Zantac 150 mg twice a day as needed for burning in stomach after eating      Kasia Tillman PA-C  [facility]

## 2017-12-12 NOTE — MR AVS SNAPSHOT
After Visit Summary   12/12/2017    Keesha Norton    MRN: [id number]           Patient Information     Date Of Birth          1985        Visit Information        Provider Department      12/12/2017 7:20 AM Kasia Tillman PA-C [facility]        Today's Diagnoses     Epididymitis    -  1    Gastroesophageal reflux disease without esophagitis          Care Instructions    Please take Bactrim 1 tablet twice a day  twice a day with large amount of water  Increase fluids    Zantac 150 mg twice a day as needed for burning in stomach after eating  Discharge Instructions for Epididymitis  You have been diagnosed with epididymitis. This is an inflammation of a coiled tube called the epididymis that is located behind your testicle, inside the scrotum. The epididymis collects and stores sperm made by the testicles. Epididymitis is often caused by bacteria in the urinary tract or by bacteria passed between partners during sex. It can also be a complication of certain hospital procedures, or it can be caused by use of a urinary catheter. Here s what you need to do at home to care for yourself.  Home care    Be sure to finish all the medicine your healthcare provider prescribed -- even if you feel better. Not finishing the medicine can make your condition harder to treat or cause the condition to come back.    Rest in bed if you are uncomfortable. Discomfort should go away within 1 to 3 days of treatment. Swelling may persist for up to 2 months.    Ask your healthcare provider about pain medicine to keep you comfortable.    Use an ice pack or bag of frozen peas to help relieve the pain. Wrap the peas or ice pack in a thin cloth and apply to the area.    Don t leave the ice pack on your skin for longer than 20 minutes, and don t use it more often than once every hour.    Elevate the scrotum with a rolled-up towel when you are resting.    For the first few days, wear an  athletic supporter. When your pain subsides, wear briefs instead of boxers to better support the scrotum. This can help relieve pain.    Keep your penis and scrotum clean.    Use a condom to protect against sexually transmitted diseases (STDs).    If your condition was caused by an STD, be sure to inform your sexual partner(s).  Follow-up care  Make a follow-up appointment or as directed by your healthcare provider.  When to call your healthcare provider  Call your healthcare provider right away if you have any of the following:    Increased pain or swelling in the scrotum    Frequent urge or inability to urinate    Discharge from the penis    Pain during ejaculation    Fever above 100.4 F (38 C)   Date Last Reviewed: 1/1/2017 2000-2017 [employer]. [address] [place] [address]. All rights reserved. This information is not intended as a substitute for professional medical care. Always follow your healthcare professional's instructions.        What are Epididymitis and Orchitis?    The epididymis is a coiled tube. It is part of the male reproductive system. A man s two testicles sit inside the scrotum. One epididymis sits behind each testicle. Epididymitis is inflammation (swelling and irritation) of this tube. Orchitis occurs when the inflammation spreads to the testicle.  Normal flow of sperm and urine  Sperm are made in the testicles. Sperm travel from the testicles through the epididymis. They flow into a tube called the vas deferens. During ejaculation, sperm pass from the vas deferens through the urethra out of the body. During urination, urine flows from the bladder through the urethra out of the body.  How the problem starts  The problem is thought to be due to bacteria. If bacteria get into the urethra, they may cause an infection. Infection can then travel up the urethra into the epididymis. This leads to inflammation. Pain and swelling can then spread to the testicle. This is  called orchitis. In rare cases, orchitis can be due to an infection with the mumps virus. There are two kinds of inflammation:    Acute inflammation comes on quickly. Symptoms may include:    Pain and swelling in the scrotum    Frequent urge to urinate    Discharge from the penis    Pain during ejaculation    Fever    Chronic inflammation is most often the late phase of an acute infection. Symptoms may include:    An ache or dull pain in the scrotum, which may spread to the groin    A heavy feeling in the scrotum  Date Last Reviewed: 1/1/2017 2000-2017 [employer]. [address] [place] [address]. All rights reserved. This information is not intended as a substitute for professional medical care. Always follow your healthcare professional's instructions.        Tips to Control Acid Reflux    To control acid reflux, you ll need to make some basic diet and lifestyle changes. The simple steps outlined below may be all you ll need to ease discomfort.  Watch what you eat    Avoid fatty foods and spicy foods.    Eat fewer acidic foods, such as citrus and tomato-based foods. These can increase symptoms.    Limit drinking alcohol, caffeine, and fizzy beverages. All increase acid reflux.    Try limiting chocolate, peppermint, and spearmint. These can worsen acid reflux in some people.  Watch when you eat    Avoid lying down for 3 hours after eating.    Do not snack before going to bed.  Raise your head  Raising your head and upper body by 4 to 6 inches helps limit reflux when you re lying down. Put blocks under the head of your bed frame to raise it.  Other changes    Lose weight, if you need to    Don t exercise near bedtime    Avoid tight-fitting clothes    Limit aspirin and ibuprofen    Stop smoking   Date Last Reviewed: 7/1/2016 2000-2017 [employer]. [address] [place] [address]. All rights reserved. This information is not intended as a substitute for  "professional medical care. Always follow your healthcare professional's instructions.                Follow-ups after your visit        Who to contact     If you have questions or need follow up information about today's clinic visit or your schedule please contact [facility] directly at [phone].  Normal or non-critical lab and imaging results will be communicated to you by MyChart, letter or phone within 4 business days after the clinic has received the results. If you do not hear from us within 7 days, please contact the clinic through MyChart or phone. If you have a critical or abnormal lab result, we will notify you by phone as soon as possible.  Submit refill requests through [employer] or call your pharmacy and they will forward the refill request to us. Please allow 3 business days for your refill to be completed.          Additional Information About Your Visit        MyChart Information     [employer] lets you send messages to your doctor, view your test results, renew your prescriptions, schedule appointments and more. To sign up, go to [email or web address] . Click on \"Log in\" on the left side of the screen, which will take you to the Welcome page. Then click on \"Sign up Now\" on the right side of the page.     You will be asked to enter the access code listed below, as well as some personal information. Please follow the directions to create your username and password.     Your access code is: [email or web address]  Expires: 3/12/2018  7:57 AM     Your access code will  in 90 days. If you need help or a new code, please call your [facility] or [phone].        Care EveryWhere ID     This is your Care EveryWhere ID. This could be used by other organizations to access your [place] medical records  [id number]        Your Vitals Were     Pulse Temperature BMI (Body Mass Index)             64 97.8  F (36.6  C) (Oral) 22.71 kg/m2          Blood Pressure from Last 3 Encounters: "   12/12/17 113/69   11/27/17 121/72   02/20/17 133/82    Weight from Last 3 Encounters:   12/12/17 158 lb 4.8 oz (71.8 kg)   11/27/17 164 lb 9.6 oz (74.7 kg)   02/20/17 167 lb (75.8 kg)              We Performed the Following     CHLAMYDIA TRACHOMATIS PCR     NEISSERIA GONORRHOEA PCR     UA with Microscopic          Today's Medication Changes          These changes are accurate as of: 12/12/17  7:57 AM.  If you have any questions, ask your nurse or doctor.               Start taking these medicines.        Dose/Directions    ranitidine 150 MG tablet   Commonly known as:  ZANTAC   Used for:  Gastroesophageal reflux disease without esophagitis   Started by:  Kasia Tillman PA-C        Dose:  150 mg   Take 1 tablet (150 mg) by mouth 2 times daily   Quantity:  60 tablet   Refills:  1       sulfamethoxazole-trimethoprim 800-160 MG per tablet   Commonly known as:  BACTRIM DS/SEPTRA DS   Used for:  Epididymitis   Started by:  Kasia Tillman PA-C        Dose:  1 tablet   Take 1 tablet by mouth 2 times daily for 14 days   Quantity:  28 tablet   Refills:  0            Where to get your medicines      These [place] [facility] [place] - [address]  [address] [place], [place] [address]     Phone:  [phone]     ranitidine 150 MG tablet    sulfamethoxazole-trimethoprim 800-160 MG per tablet                Primary Care Provider Office Phone # Fax #    Zainab Geraldine Fish [phone]       [address]  [place] [address]        Equal Access to Services     Kern ValleyLESLI AH: Hadii crista abraham Sorobinson, waaxda luqadaha, qaybta kaalmada adeegyada, jeffery spring. So [facility] [phone].    ATENCIÓN: Si habla español, tiene a [place] disposición servicios gratuitos de asistencia lingüística. Llame al [phone].    We comply with applicable federal civil rights laws and [place]  laws. We do not discriminate on the basis of race, color, national origin, age, disability, sex, sexual orientation, or gender identity.            Thank you!     Thank you for choosing [facility]  for your care. Our goal is always to provide you with excellent care. Hearing back from our patients is one way we can continue to improve our services. Please take a few minutes to complete the written survey that you may receive in the mail after your visit with us. Thank you!             Your Updated Medication List - Protect others around you: Learn how to safely use, store and throw away your medicines at www.disposemymeds.org.          This list is accurate as of: 12/12/17  7:57 AM.  Always use your most recent med list.                   Brand Name Dispense Instructions for use Diagnosis    ranitidine 150 MG tablet    ZANTAC    60 tablet    Take 1 tablet (150 mg) by mouth 2 times daily    Gastroesophageal reflux disease without esophagitis       sulfamethoxazole-trimethoprim 800-160 MG per tablet    BACTRIM DS/SEPTRA DS    28 tablet    Take 1 tablet by mouth 2 times daily for 14 days    Epididymitis

## 2017-12-14 ENCOUNTER — TELEPHONE (OUTPATIENT)
Dept: FAMILY MEDICINE | Facility: CLINIC | Age: 32
End: 2017-12-14

## 2017-12-14 LAB
C TRACH DNA SPEC QL NAA+PROBE: NEGATIVE
N GONORRHOEA DNA SPEC QL NAA+PROBE: NEGATIVE
SPECIMEN SOURCE: NORMAL
SPECIMEN SOURCE: NORMAL

## 2017-12-14 NOTE — TELEPHONE ENCOUNTER
Reason for Call:  Request for results:    Name of test or procedure: Urine     Date of test of procedure: 12/12    Location of the test or procedure: [facility]      OK to leave the result message on voice mail or with a family member? YES    Phone number Patient can be reached at:  Cell number on file:    Telephone Information:   Mobile [phone]       Additional comments: any    Was seen by Tia Gomez taken on 12/14/2017 at 4:52 PM by Maureen May

## 2017-12-15 NOTE — TELEPHONE ENCOUNTER
This writer attempted to contact patient on 12/15/17      Reason for call results and left detailed message with results and plan.      Meliza Mcgregor MA

## 2017-12-15 NOTE — TELEPHONE ENCOUNTER
This result is viewable by the patient in MyChart.   Result Notes   Notes Recorded by Patricia Padgett on 11/29/2017 at 7:27 AM  Sent a letter  ------    Notes Recorded by Sue Law PA-C on 11/28/2017 at 8:25 PM  Urine culture showed no growth. Follow up with Primary if no resolution of symptoms.    Sue Law PA-C    ------    Notes Recorded by Sue Law PA-C on 11/28/2017 at 1:27 PM  Kidney function and electrolytes are completely normal.  Sue Law PA-C    ------    Notes Recorded by Sue Law PA-C on 11/27/2017 at 7:25 PM  Discussed results in clinic.     Normal results MA to call.    Radha Izquierdo RN, [facility] T[facility]

## 2017-12-28 ENCOUNTER — OFFICE VISIT (OUTPATIENT)
Dept: FAMILY MEDICINE | Facility: CLINIC | Age: 32
End: 2017-12-28
Payer: COMMERCIAL

## 2017-12-28 VITALS
DIASTOLIC BLOOD PRESSURE: 72 MMHG | BODY MASS INDEX: 22.67 KG/M2 | TEMPERATURE: 97.7 F | HEART RATE: 106 BPM | SYSTOLIC BLOOD PRESSURE: 108 MMHG | WEIGHT: 158 LBS | OXYGEN SATURATION: 99 %

## 2017-12-28 DIAGNOSIS — H61.22 IMPACTED CERUMEN OF LEFT EAR: Primary | ICD-10-CM

## 2017-12-28 PROCEDURE — 69209 REMOVE IMPACTED EAR WAX UNI: CPT | Mod: LT | Performed by: NURSE PRACTITIONER

## 2017-12-28 PROCEDURE — 99213 OFFICE O/P EST LOW 20 MIN: CPT | Mod: 25 | Performed by: NURSE PRACTITIONER

## 2017-12-28 NOTE — MR AVS SNAPSHOT
After Visit Summary   12/28/2017    Keesha Norton    MRN: [id number]           Patient Information     Date Of Birth          1985        Visit Information        Provider Department      12/28/2017 4:20 PM Marcelle Marie APRN CNP [facility]        Today's Diagnoses     Impacted cerumen of left ear    -  1      Care Instructions    At [facility], we strive to deliver an exceptional experience to you, every time we see you.  If you receive a survey in the mail, please send us back your thoughts. We really do value your feedback.    Based on your medical history, these are the current health maintenance/preventive care services that you are due for (some may have been done at this visit.)  Health Maintenance Due   Topic Date Due     INFLUENZA VACCINE (SYSTEM ASSIGNED)  09/01/2017         Suggested websites for health information:  WwwHBCS : Up to date and easily searchable information on multiple topics.  Www.[employer].gov : medication info, interactive tutorials, watch real surgeries online  Www.familydoctor.org : good info from the Academy of Family Physicians  Www.cdc.gov : public health info, travel advisories, epidemics (H1N1)  Www.aap.org : children's health info, normal development, vaccinations  Www.health.[facility].[place].us : [place] dept of health, public health issues in [place], N1N1    Your care team:                            Family Medicine Internal Medicine   MD Jose Manuel Patel MD Shantel Branch-Fleming, MD Katya Georgiev PA-C Nam Ho, MD Pediatrics   ADITYA Cruz, MD Sanam Knight CNP, MD Deborah Mielke, MD Kim Thein, APRN CNP      Clinic hours: Monday - Thursday 7 am-7 pm; Fridays 7 am-5 pm.   Urgent care: Monday - Friday 11 am-9 pm; Saturday and Sunday 9 am-5 pm.  Pharmacy : Monday -Thursday 8 am-8 pm; Friday 8 am-6 pm; Saturday and Sunday 9  am-5 pm.     Clinic: [phone]   Pharmacy: [phone]              Follow-ups after your visit        Follow-up notes from your care team     Return if symptoms worsen or fail to improve.      Who to contact     If you have questions or need follow up information about today's clinic visit or your schedule please contact [facility] directly at [phone].  Normal or non-critical lab and imaging results will be communicated to you by Involviohart, letter or phone within 4 business days after the clinic has received the results. If you do not hear from us within 7 days, please contact the clinic through [employer] or phone. If you have a critical or abnormal lab result, we will notify you by phone as soon as possible.  Submit refill requests through [employer] or call your pharmacy and they will forward the refill request to us. Please allow 3 business days for your refill to be completed.          Additional Information About Your Visit        Involviohart Information     [employer] gives you secure access to your electronic health record. If you see a primary care provider, you can also send messages to your care team and make appointments. If you have questions, please call your primary care clinic.  If you do not have a primary care provider, please call [phone] and they will assist you.        Care EveryWhere ID     This is your Care EveryWhere ID. This could be used by other organizations to access your [place] medical records  [id number]        Your Vitals Were     Pulse Temperature Pulse Oximetry BMI (Body Mass Index)          106 97.7  F (36.5  C) (Tympanic) 99% 22.67 kg/m2         Blood Pressure from Last 3 Encounters:   12/28/17 108/72   12/12/17 113/69   11/27/17 121/72    Weight from Last 3 Encounters:   12/28/17 158 lb (71.7 kg)   12/12/17 158 lb 4.8 oz (71.8 kg)   11/27/17 164 lb 9.6 oz (74.7 kg)              We Performed the Following     REMOVE IMPACTED CERUMEN        Primary Care  Provider Office Phone # Fax #    Zainab Geraldine Fish [phone]       [address]  [facility] [address]        Equal Access to Services     MARIA ELENA MACIEL : Hadii crista ku hadjoeo Soomaali, waaxda luqadaha, qaybta kaalmada adeegyada, waxay idiin hayarnaldon shamika simpson lalouie spring. So [facility] [phone].    ATENCIÓN: Si habla español, tiene a washing[place] disposición servicios gratuitos de asistencia lingüística. Llame al [phone].    We comply with applicable federal civil rights laws and [place] laws. We do not discriminate on the basis of race, color, national origin, age, disability, sex, sexual orientation, or gender identity.            Thank you!     Thank you for choosing [facility]  for your care. Our goal is always to provide you with excellent care. Hearing back from our patients is one way we can continue to improve our services. Please take a few minutes to complete the written survey that you may receive in the mail after your visit with us. Thank you!             Your Updated Medication List - Protect others around you: Learn how to safely use, store and throw away your medicines at www.disposemymeds.org.          This list is accurate as of: 12/28/17 11:59 PM.  Always use your most recent med list.                   Brand Name Dispense Instructions for use Diagnosis    ciprofloxacin 250 MG tablet    CIPRO          ranitidine 150 MG tablet    ZANTAC    60 tablet    Take 1 tablet (150 mg) by mouth 2 times daily    Gastroesophageal reflux disease without esophagitis

## 2017-12-28 NOTE — PATIENT INSTRUCTIONS
At [facility], we strive to deliver an exceptional experience to you, every time we see you.  If you receive a survey in the mail, please send us back your thoughts. We really do value your feedback.    Based on your medical history, these are the current health maintenance/preventive care services that you are due for (some may have been done at this visit.)  Health Maintenance Due   Topic Date Due     INFLUENZA VACCINE (SYSTEM ASSIGNED)  09/01/2017         Suggested websites for health information:  Www.[employer].org : Up to date and easily searchable information on multiple topics.  Www.medlineplus.gov : medication info, interactive tutorials, watch real surgeries online  Www.familydoctor.org : good info from the Academy of Family Physicians  Www.cdc.gov : public health info, travel advisories, epidemics (H1N1)  Www.aap.org : children's health info, normal development, vaccinations  Www.health.[facility].[place].us : [place] dept of health, public health issues in [place], N1N1    Your care team:                            Family Medicine Internal Medicine   MD Jose Manuel Patel MD Shantel Branch-Fleming, MD Katya Georgiev PA-C Nam Ho, MD Pediatrics   Lester Rios PARICHELLE Marie, CNP Teagan Lewis APRN CNP   MD Sanam Wen MD Deborah Mielke, MD Kim Thein, APRN CNP      Clinic hours: Monday - Thursday 7 am-7 pm; Fridays 7 am-5 pm.   Urgent care: Monday - Friday 11 am-9 pm; Saturday and Sunday 9 am-5 pm.  Pharmacy : Monday -Thursday 8 am-8 pm; Friday 8 am-6 pm; Saturday and Sunday 9 am-5 pm.     Clinic: [phone]   Pharmacy: [phone]

## 2017-12-28 NOTE — PROGRESS NOTES
SUBJECTIVE:   Keesha Norton is a 32 year old male who presents to clinic today for the following health issues:        ENT Symptoms             Symptoms: cc Present Absent Comment   Fever/Chills   x    Fatigue   x    Muscle Aches       Eye Irritation   x    Sneezing   x    Nasal Milind/Drg   x    Sinus Pressure/Pain   x    Loss of smell   x    Dental pain       Sore Throat   x    Swollen Glands   x    Ear Pain/Fullness  X  Hearing lost left ear been gone for 5 days   Cough   x    Wheeze       Chest Pain       Shortness of breath       Rash       Other head hurt    Behind left ear     Symptom duration:  8 days   Symptom severity:  severe   Treatments tried:  NA   Contacts:  NA     Did have flu like symptoms about 5 days ago which have now resolved.  He did not have earpain at that time.  Denies use of qtips.  Does admit to having a lot of wax in his ears often.    Problem list and histories reviewed & adjusted, as indicated.  Additional history: as documented    Patient Active Problem List   Diagnosis     CARDIOVASCULAR SCREENING; LDL GOAL LESS THAN 160     Vitamin D insufficiency     Elevated bilirubin     GERD (gastroesophageal reflux disease)     Gilbert's disease     Herpes zoster without complication     Right flank pain     Past Surgical History:   Procedure Laterality Date     ESOPHAGOSCOPY, GASTROSCOPY, DUODENOSCOPY (EGD), COMBINED N/A 2/13/2015    Procedure: COMBINED ESOPHAGOSCOPY, GASTROSCOPY, DUODENOSCOPY (EGD);  Surgeon: Jair Dickinson MD;  Location:  OR     ESOPHAGOSCOPY, GASTROSCOPY, DUODENOSCOPY (EGD), COMBINED Left 2/13/2015    Procedure: COMBINED ESOPHAGOSCOPY, GASTROSCOPY, DUODENOSCOPY (EGD), BIOPSY SINGLE OR MULTIPLE;  Surgeon: Jair Dickinson MD;  Location:  OR     ESOPHAGOSCOPY, GASTROSCOPY, DUODENOSCOPY (EGD), COMBINED N/A 4/6/2015    Procedure: COMBINED ESOPHAGOSCOPY, GASTROSCOPY, DUODENOSCOPY (EGD);  Surgeon: Arsen Bashir MD;  Location:  OR      TOOTH  EXTRACTION W/FORCEP         Social History   Substance Use Topics     Smoking status: Never Smoker     Smokeless tobacco: Never Used     Alcohol use Yes      Comment: may once a month     Family History   Problem Relation Age of Onset     Hypertension Mother      Prostate Cancer Maternal Grandfather      Coronary Artery Disease Maternal Grandfather      CEREBROVASCULAR DISEASE Paternal Grandmother      Asthma Maternal Uncle      DIABETES Paternal Aunt      Cancer - colorectal No family hx of      Breast Cancer No family hx of      Thyroid Disease No family hx of      CANCER No family hx of      Glaucoma No family hx of      Macular Degeneration No family hx of      Hyperlipidemia No family hx of      Ovarian Cancer No family hx of      Thyroid Disease No family hx of          Current Outpatient Prescriptions   Medication Sig Dispense Refill     ciprofloxacin (CIPRO) 250 MG tablet        ranitidine (ZANTAC) 150 MG tablet Take 1 tablet (150 mg) by mouth 2 times daily 60 tablet 1     Allergies   Allergen Reactions     Seasonal Allergies          Reviewed and updated as needed this visit by clinical staff     Reviewed and updated as needed this visit by Provider         ROS:  Constitutional, HEENT, cardiovascular, pulmonary, gi and gu systems are negative, except as otherwise noted.      OBJECTIVE:   /72 (BP Location: Left arm, Patient Position: Sitting, Cuff Size: Adult Regular)  Pulse 106  Temp 97.7  F (36.5  C) (Tympanic)  Wt 158 lb (71.7 kg)  SpO2 99%  BMI 22.67 kg/m2  Body mass index is 22.67 kg/(m^2).  GENERAL: healthy, alert and no distress  EYES: Eyes grossly normal to inspection, PERRL and conjunctivae and sclerae normal  HENT: normal cephalic/atraumatic, right ear: normal: no effusions, no erythema, normal landmarks and small amt of wax in canal but TM easily visible, left ear: occluded with wax, nose and mouth without ulcers or lesions, oropharynx clear and oral mucous membranes moist  NECK: no  adenopathy, no asymmetry, masses, or scars and thyroid normal to palpation  RESP: lungs clear to auscultation - no rales, rhonchi or wheezes  CV: regular rate and rhythm, normal S1 S2, no S3 or S4, no murmur, click or rub, no peripheral edema and peripheral pulses strong  MS: no gross musculoskeletal defects noted, no edema    Diagnostic Test Results:  none     ASSESSMENT/PLAN:     1. Impacted cerumen of left ear  Removed via irrigation and currette.  Large amt of wax obtained.  Patient with return of hearing and relief of ear pain immediately.  To return with any issues.  - REMOVE IMPACTED CERUMEN    See Patient Instructions    MORRIS Ruffin [facility]

## 2017-12-28 NOTE — NURSING NOTE
"Chief Complaint   Patient presents with     Ear Problem     Headache       Initial /72 (BP Location: Left arm, Patient Position: Sitting, Cuff Size: Adult Regular)  Pulse 106  Temp 97.7  F (36.5  C) (Tympanic)  Wt 158 lb (71.7 kg)  SpO2 99%  BMI 22.67 kg/m2 Estimated body mass index is 22.67 kg/(m^2) as calculated from the following:    Height as of 2/20/17: 5' 10\" (1.778 m).    Weight as of this encounter: 158 lb (71.7 kg).  Medication Reconciliation: complete   Emiliana FARMER      "

## 2017-12-29 RX ORDER — CIPROFLOXACIN 250 MG/1
TABLET, FILM COATED ORAL
COMMUNITY
Start: 2017-11-27 | End: 2018-02-16

## 2018-02-12 ENCOUNTER — OFFICE VISIT (OUTPATIENT)
Dept: URGENT CARE | Facility: URGENT CARE | Age: 33
End: 2018-02-12
Payer: COMMERCIAL

## 2018-02-12 VITALS
RESPIRATION RATE: 20 BRPM | SYSTOLIC BLOOD PRESSURE: 155 MMHG | HEART RATE: 85 BPM | HEIGHT: 70 IN | OXYGEN SATURATION: 100 % | BODY MASS INDEX: 22.76 KG/M2 | TEMPERATURE: 96.9 F | WEIGHT: 159 LBS | DIASTOLIC BLOOD PRESSURE: 93 MMHG

## 2018-02-12 DIAGNOSIS — R20.2 NUMBNESS AND TINGLING OF LEFT ARM AND LEG: ICD-10-CM

## 2018-02-12 DIAGNOSIS — R20.0 NUMBNESS AND TINGLING OF LEFT ARM AND LEG: ICD-10-CM

## 2018-02-12 DIAGNOSIS — R07.9 ACUTE CHEST PAIN: Primary | ICD-10-CM

## 2018-02-12 PROCEDURE — 99214 OFFICE O/P EST MOD 30 MIN: CPT | Performed by: PHYSICIAN ASSISTANT

## 2018-02-12 PROCEDURE — 93000 ELECTROCARDIOGRAM COMPLETE: CPT | Performed by: PHYSICIAN ASSISTANT

## 2018-02-12 NOTE — MR AVS SNAPSHOT
"              After Visit Summary   2/12/2018    Keesha Norton    MRN: [id number]           Patient Information     Date Of Birth          1985        Visit Information        Provider Department      2/12/2018 7:20 PM Sue Law PA-C [facility]        Today's Diagnoses     Acute chest pain    -  1    Numbness and tingling of left arm and leg           Follow-ups after your visit        Who to contact     If you have questions or need follow up information about today's clinic visit or your schedule please contact [facility] directly at [phone].  Normal or non-critical lab and imaging results will be communicated to you by Private Companyhart, letter or phone within 4 business days after the clinic has received the results. If you do not hear from us within 7 days, please contact the clinic through Private Companyhart or phone. If you have a critical or abnormal lab result, we will notify you by phone as soon as possible.  Submit refill requests through ExpertFlyer or call your pharmacy and they will forward the refill request to us. Please allow 3 business days for your refill to be completed.          Additional Information About Your Visit        MyChart Information     ExpertFlyer gives you secure access to your electronic health record. If you see a primary care provider, you can also send messages to your care team and make appointments. If you have questions, please call your primary care clinic.  If you do not have a primary care provider, please call [phone] and they will assist you.        Care EveryWhere ID     This is your Care EveryWhere ID. This could be used by other organizations to access your [facility] medical records  [id number]        Your Vitals Were     Pulse Temperature Respirations Height Pulse Oximetry BMI (Body Mass Index)    85 96.9  F (36.1  C) (Tympanic) 20 5' 10.47\" (1.79 m) 100% 22.51 kg/m2       Blood Pressure from Last 3 Encounters:   02/12/18 (!) " 155/93   12/28/17 108/72   12/12/17 113/69    Weight from Last 3 Encounters:   02/12/18 159 lb (72.1 kg)   12/28/17 158 lb (71.7 kg)   12/12/17 158 lb 4.8 oz (71.8 kg)              We Performed the Following     EKG 12-lead complete w/read - Clinics        Primary Care Provider Office Phone # Fax #    Zainab Chandler Luiz Fish [phone]       [address]  [facility] [address]        Equal Access to Services     [facility]: Hadii aad ku hadasho Soomaali, waaxda luqadaha, qaybta kaalmada adeegyada, waxviolet cyrin hayaan adecelena hawley . So [facility] [phone].    ATENCIÓN: Si habla español, tiene a [place] disposición servicios gratuitos de asistencia lingüística. Llame al [phone].    We comply with applicable federal civil rights laws and [place] laws. We do not discriminate on the basis of race, color, national origin, age, disability, sex, sexual orientation, or gender identity.            Thank you!     Thank you for choosing [facility]  for your care. Our goal is always to provide you with excellent care. Hearing back from our patients is one way we can continue to improve our services. Please take a few minutes to complete the written survey that you may receive in the mail after your visit with us. Thank you!             Your Updated Medication List - Protect others around you: Learn how to safely use, store and throw away your medicines at www.disposemymeds.org.          This list is accurate as of 2/12/18  8:27 PM.  Always use your most recent med list.                   Brand Name Dispense Instructions for use Diagnosis    ciprofloxacin 250 MG tablet    CIPRO          ranitidine 150 MG tablet    ZANTAC    60 tablet    Take 1 tablet (150 mg) by mouth 2 times daily    Gastroesophageal reflux disease without esophagitis

## 2018-02-13 NOTE — NURSING NOTE
"Chief Complaint   Patient presents with     Chest Pain       Initial BP (!) 155/93 (BP Location: Left arm, Patient Position: Chair, Cuff Size: Adult Regular)  Pulse 85  Temp 96.9  F (36.1  C) (Tympanic)  Resp 20  Ht 5' 10.47\" (1.79 m)  Wt 159 lb (72.1 kg)  SpO2 100%  BMI 22.51 kg/m2 Estimated body mass index is 22.51 kg/(m^2) as calculated from the following:    Height as of this encounter: 5' 10.47\" (1.79 m).    Weight as of this encounter: 159 lb (72.1 kg).  Medication Reconciliation: completecomplete    Jessica Sequeira MA      "

## 2018-02-16 ENCOUNTER — OFFICE VISIT (OUTPATIENT)
Dept: FAMILY MEDICINE | Facility: CLINIC | Age: 33
End: 2018-02-16
Payer: COMMERCIAL

## 2018-02-16 VITALS
OXYGEN SATURATION: 98 % | SYSTOLIC BLOOD PRESSURE: 122 MMHG | HEART RATE: 74 BPM | TEMPERATURE: 97 F | BODY MASS INDEX: 21.8 KG/M2 | DIASTOLIC BLOOD PRESSURE: 82 MMHG | WEIGHT: 154 LBS

## 2018-02-16 DIAGNOSIS — R20.0 ARM NUMBNESS LEFT: Primary | ICD-10-CM

## 2018-02-16 DIAGNOSIS — K21.9 GASTROESOPHAGEAL REFLUX DISEASE, ESOPHAGITIS PRESENCE NOT SPECIFIED: ICD-10-CM

## 2018-02-16 DIAGNOSIS — R07.89 CHEST DISCOMFORT: ICD-10-CM

## 2018-02-16 PROCEDURE — 99214 OFFICE O/P EST MOD 30 MIN: CPT | Performed by: NURSE PRACTITIONER

## 2018-02-16 ASSESSMENT — ANXIETY QUESTIONNAIRES
2. NOT BEING ABLE TO STOP OR CONTROL WORRYING: SEVERAL DAYS
5. BEING SO RESTLESS THAT IT IS HARD TO SIT STILL: NOT AT ALL
3. WORRYING TOO MUCH ABOUT DIFFERENT THINGS: NOT AT ALL
7. FEELING AFRAID AS IF SOMETHING AWFUL MIGHT HAPPEN: NOT AT ALL
1. FEELING NERVOUS, ANXIOUS, OR ON EDGE: NEARLY EVERY DAY
GAD7 TOTAL SCORE: 5
6. BECOMING EASILY ANNOYED OR IRRITABLE: SEVERAL DAYS

## 2018-02-16 ASSESSMENT — PATIENT HEALTH QUESTIONNAIRE - PHQ9: 5. POOR APPETITE OR OVEREATING: NOT AT ALL

## 2018-02-16 NOTE — MR AVS SNAPSHOT
After Visit Summary   2/16/2018    Keesha Norton    MRN: [id number]           Patient Information     Date Of Birth          1985        Visit Information        Provider Department      2/16/2018 8:00 AM Brunilda Stewart APRN [facility]        Today's Diagnoses     Arm numbness left    -  1    Chest discomfort          Care Instructions    Your blood pressure is normal today  I think you likely have a pinched nerve in your neck/upper back causing your left arm symptoms. See the attached handout. If you have an increase in symptoms or they become bothersome, we can consider imaging.  Your chest discomfort has been determined not to be due to your heart from your most recent emergency department visit. I think it might be related to your acid reflux. Restart omeprazole 20 mg daily x 1 month.   If severe chest pain returns, shortness of breath, numbness/tingling call 911 or go to emergency department.  Otherwise I recommend follow up in 1 month.      Taking Your Blood Pressure  Blood pressure is the force of blood against the artery wall as it moves from the heart through the blood vessels. You can take your own blood pressure reading using a digital monitor. Take your readings the same each time, using the same arm. Take readings as often as your healthcare provider instructs.  About blood pressure monitors  Blood pressure monitors are designed for certain ages and cases. You can find monitors for older adults, for pregnant women, and for children. Make sure the one you choose is the right one for your age and situation.  The American Heart Association recommends an automatic cuff monitor that fits on your upper arm (bicep). The cuff should fit your arm size. A cuff that s too large or too small will not give an accurate reading. Measure around your upper arm to find your size.  Monitors that attach to your finger or wrist are not as accurate as monitors for your upper  arm.  Ask your healthcare provider for help in choosing a monitor. Bring your monitor to your next provider visit if you need help in using it the correct way.  The steps below are general instructions for using an automatic digital monitor.  Step 1. Relax      Take your blood pressure at the same time every day, such as in the morning or evening, or at the time your healthcare provider recommends.    Wait at least a half-hour after smoking, eating, or exercising. Don't drink coffee, tea, soda, or other caffeinated beverages before checking your blood pressure.    Sit comfortably at a table with both feet on the floor. Do not cross your legs or feet. Place the monitor near you.    Rest for a few minutes before you begin.  Step 2. Wrap the cuff      Place your arm on the table, palm up. Your arm should be at the level of your heart. Wrap the cuff around your upper arm, just above your elbow. It s best done on bare skin, not over clothing. Most cuffs will indicate where the brachial artery (the blood vessel in the middle of the arm at the inner side of the elbow) should line up with the cuff. Look in your monitor's instruction booklet for an illustration. You can also bring your cuff to your healthcare provider and have them show you how to correctly place the cuff.  Step 3. Inflate the cuff      Push the button that starts the pump.    The cuff will tighten, then loosen.    The numbers will change. When they stop changing, your blood pressure reading will appear.    Take 2 or 3 readings one minute apart.  Step 4. Write down the results of each reading      Write down your blood pressure numbers for each reading. Note the date and time. Keep your results in one place, such as a notebook. Even if your monitor has a built-in memory, keep a hard copy of the readings.    Remove the cuff from your arm. Turn off the machine.    Bring your blood pressure records with your healthcare providers at each visit.    If you start a  new blood pressure medicine, note the day you started the new medicine. Also note the day if you change the dose of your medicine. This information goes on your blood pressure recording sheet. This will help your healthcare provider monitor how well the medicine changes are working.    Ask your healthcare provider what numbers should prompt you to call him or her. Also ask what numbers should prompt you to get help right away.  Date Last Reviewed: 11/1/2016 2000-2017 [employer] [address] [place] [address], [place] [address]. All rights reserved. This information is not intended as a substitute for professional medical care. Always follow your healthcare professional's instructions.        Pinched Nerve in the Neck  A pinched nerve in the neck (cervical radiculopathy) is caused when the nerve that goes from the spinal cord to the neck or arm is irritated or has pressure on it. This may be caused by a bulging spinal disk. A spinal disk is the cushion between each spinal bone. Or it may be caused by a narrowing of the spinal joint because of osteoarthritis and wear and tear from repeated injuries.  A pinched nerve can cause numbness, tingling, deep aching, or electrical shooting pain from the side of the neck all the way down to the fingers on one side.  A pinched nerve may start after a sudden turning or bending force (such as in a car accident) or after a simple awkward movement. In either case, muscle spasm is commonly present and adds to the pain.  Home care  Follow these guidelines when caring for yourself at home:    Rest and relax the muscles. Use a comfortable pillow that supports your head and keeps your spine in a natural (neutral) position. Your head shouldn t be tilted forward or backward. A rolled-up towel may help for a custom fit. When standing or sitting, keep your neck in line with your body. Keep your head up and shoulders down. Stay away from activities that require you to move your  neck a lot.    You can use heat and massage to help ease the pain. Take a hot shower or bath, or use a heating pad. You can also use a cold pack for relief. You can make a cold pack by wrapping a plastic bag of crushed or cubed ice in a thin towel. Try both heat and cold, and use the method that feels best. Do this for 20 minutes several times a day.    You may use acetaminophen or ibuprofen to control pain, unless another pain medicine was prescribed. If you have chronic liver or kidney disease, talk with your healthcare provider before using these medicines. Also talk with your provider if you ve had a stomach ulcer or gastrointestinal bleeding.    Reduce stress. Stress can make it longer for your pain to go away.    Do any exercises or stretches that were given to you as part of your discharge plan.    Wear a soft collar, if prescribed.    Physical therapy and massages are known to help.    You may need surgery for a more serious injury.  Follow-up care  Follow up with your healthcare provider, or as advised, if you don t start to get better after 1 week. You may need more tests. Tell your provider about any fever, chills, or weight loss.  If X-rays were taken, a radiologist may look at them. You will be told of any new findings that may affect your care.  When to seek medical advice  Call your healthcare provider right away if any of these occur:    Pain becomes worse even after taking prescribed pain medicine    Weakness in the arm or legs    Numbness in the arm gets worse    Trouble breathing or swallowing  Date Last Reviewed: 5/1/2017 2000-2017 The [employer]. [address] [place] [address], [place] [id number]. All rights reserved. This information is not intended as a substitute for professional medical care. Always follow your healthcare professional's instructions.        GERD (Adult)    The esophagus is a tube that carries food from the mouth to the stomach. A valve at the lower end of the esophagus  "prevents stomach acid from flowing upward. When this valve doesn't work properly, stomach contents may repeatedly flow back up (reflux) into the esophagus. This is called gastroesophageal reflux disease (GERD). GERD can irritate the esophagus. It can cause problems with swallowing or breathing. In severe cases, GERD can cause recurrent pneumonia or other serious problems.  Symptoms of reflux include burning, pressure or sharp pain in the upper abdomen or mid to lower chest. The pain can spread to the neck, back, or shoulder. There may be belching, an acid taste in the back of the throat, chronic cough, or sore throat or hoarseness. GERD symptoms often occur during the day after a big meal. They can also occur at night when lying down.   Home care  Lifestyle changes can help reduce symptoms. If needed, medicines may be prescribed. Symptoms often improve with treatment, but if treatment is stopped, the symptoms often return after a few months. So most persons with GERD will need to continue treatment.  Lifestyle changes    Limit or avoid fatty, fried, and spicy foods, as well as coffee, chocolate, mint, and foods with high acid content such as tomatoes and citrus fruit and juices (orange, grapefruit, lemon).    Don t eat large meals, especially at night. Frequent, smaller meals are best. Do not lie down right after eating. And don t eat anything 3 hours before going to bed.    Avoid drinking alcohol and smoking. As much as possible, stay away from second hand smoke.    If you are overweight, losing weight will reduce symptoms.     Avoid wearing tight clothing around your stomach area.    If your symptoms occur during sleep, use a foam wedge to elevate your upper body (not just your head.) Or, place 4\" blocks under the head of your bed.  Medicines  If needed, medicines can help relieve the symptoms of GERD and prevent damage to the esophagus. Discuss a medicine plan with your healthcare provider. This may include one " or more of the following medicines:    Antacids to help neutralize the normal acids in your stomach.    Acid blockers (H2 blockers) to decrease acid production.    Acid inhibitors (PPIs) to decrease acid production in a different way than the blockers. They may work better, but can take a little longer to take effect.  Take an antacid 30-60 minutes after eating and at bedtime, but not at the same time as an acid blocker.  Try not to take medicines such as ibuprofen and aspirin. If you are taking aspirin for your heart or other medical reasons, talk to your healthcare provider about stopping it.  Follow-up care  Follow up with your healthcare provider or as advised by our staff.  When to seek medical advice  Call your healthcare provider if any of the following occur:    Stomach pain gets worse or moves to the lower right abdomen (appendix area)    Chest pain appears or gets worse, or spreads to the back, neck, shoulder, or arm    Frequent vomiting (can t keep down liquids)    Blood in the stool or vomit (red or black in color)    Feeling weak or dizzy    Fever of 100.4 F (38 C) or higher, or as directed by your healthcare provider  Date Last Reviewed: 6/23/2015 2000-2017 [employer]. [address] [place], [place] [id number]. All rights reserved. This information is not intended as a substitute for professional medical care. Always follow your healthcare professional's instructions.        Lifestyle Changes for Controlling GERD  When you have GERD, stomach acid feels as if it s backing up toward your mouth. Whether or not you take medicine to control your GERD, your symptoms can often be improved with lifestyle changes. Talk to your healthcare provider about the following suggestions. These suggestions may help you get relief from your symptoms.      Raise your head  Reflux is more likely to strike when you re lying down flat, because stomach fluid can flow backward more easily. Raising the head of  your bed 4 to 6 inches can help. To do this:    Slide blocks or books under the legs at the head of your bed. Or, place a wedge under the mattress. Many foam stores can make a suitable wedge for you. The wedge should run from your waist to the top of your head.    Don t just prop your head on several pillows. This increases pressure on your stomach. It can make GERD worse.  Watch your eating habits  Certain foods may increase the acid in your stomach or relax the lower esophageal sphincter. This makes GERD more likely. It s best to avoid the following if they cause you symptoms:    Coffee, tea, and carbonated drinks (with and without caffeine)    Fatty, fried, or spicy food    Mint, chocolate, onions, and tomatoes    Peppermint    Any other foods that seem to irritate your stomach or cause you pain  Relieve the pressure  Tips include the following:    Eat smaller meals, even if you have to eat more often.    Don t lie down right after you eat. Wait a few hours for your stomach to empty.    Avoid tight belts and tight-fitting clothes.    Lose excess weight.  Tobacco and alcohol  Avoid smoking tobacco and drinking alcohol. They can make GERD symptoms worse.  Date Last Reviewed: 7/1/2016 2000-2017 [employer]. [address] [place] [address], [place] [address]. All rights reserved. This information is not intended as a substitute for professional medical care. Always follow your healthcare professional's instructions.                Follow-ups after your visit        Who to contact     If you have questions or need follow up information about today's clinic visit or your schedule please contact [facility] directly at [phone].  Normal or non-critical lab and imaging results will be communicated to you by MyChart, letter or phone within 4 business days after the clinic has received the results. If you do not hear from us within 7 days, please contact the clinic through MyChart or phone.  If you have a critical or abnormal lab result, we will notify you by phone as soon as possible.  Submit refill requests through Unidym or call your pharmacy and they will forward the refill request to us. Please allow 3 business days for your refill to be completed.          Additional Information About Your Visit        Vidatronichart Information     Unidym gives you secure access to your electronic health record. If you see a primary care provider, you can also send messages to your care team and make appointments. If you have questions, please call your primary care clinic.  If you do not have a primary care provider, please call [phone] and they will assist you.        Care EveryWhere ID     This is your Care EveryWhere ID. This could be used by other organizations to access your [facility] medical records  [id number]        Your Vitals Were     Pulse Temperature Pulse Oximetry BMI (Body Mass Index)          74 97  F (36.1  C) (Oral) 98% 21.8 kg/m2         Blood Pressure from Last 3 Encounters:   02/16/18 122/82   02/12/18 (!) 155/93   12/28/17 108/72    Weight from Last 3 Encounters:   02/16/18 69.9 kg (154 lb)   02/12/18 72.1 kg (159 lb)   12/28/17 71.7 kg (158 lb)              Today, you had the following     No orders found for display       Primary Care Provider Office Phone # Fax #    Zainab Chandler Luiz Fish [phone]       [address] [facility] [address]        Equal Access to Services     [facility]: Hadii aad ku hadasho Soomaali, waaxda luqadaha, qaybta kaalmada adeegyada, jeffery ramirez hayjonna hawley . So [facility] [place] [phone].    ATENCIÓN: Si habla español, tiene a washing[place] disposición servicios gratuitos de asistencia lingüística. Llame al [phone].    We comply with applicable federal civil rights laws and [place] laws. We do not discriminate on the basis of race, color, national origin, age, disability, sex, sexual orientation, or gender  identity.            Thank you!     Thank you for choosing [facility]  for your care. Our goal is always to provide you with excellent care. Hearing back from our patients is one way we can continue to improve our services. Please take a few minutes to complete the written survey that you may receive in the mail after your visit with us. Thank you!             Your Updated Medication List - Protect others around you: Learn how to safely use, store and throw away your medicines at www.disposemymeds.org.          This list is accurate as of 2/16/18  8:52 AM.  Always use your most recent med list.                   Brand Name Dispense Instructions for use Diagnosis    ranitidine 150 MG tablet    ZANTAC    60 tablet    Take 1 tablet (150 mg) by mouth 2 times daily    Gastroesophageal reflux disease without esophagitis

## 2018-02-16 NOTE — PATIENT INSTRUCTIONS
Your blood pressure is normal today  I think you likely have a pinched nerve in your neck/upper back causing your left arm symptoms. See the attached handout. If you have an increase in symptoms or they become bothersome, we can consider imaging.  Your chest discomfort has been determined not to be due to your heart from your most recent emergency department visit. I think it might be related to your acid reflux. Restart omeprazole 20 mg daily x 1 month.   If severe chest pain returns, shortness of breath, numbness/tingling call 911 or go to emergency department.  Otherwise I recommend follow up in 1 month.      Taking Your Blood Pressure  Blood pressure is the force of blood against the artery wall as it moves from the heart through the blood vessels. You can take your own blood pressure reading using a digital monitor. Take your readings the same each time, using the same arm. Take readings as often as your healthcare provider instructs.  About blood pressure monitors  Blood pressure monitors are designed for certain ages and cases. You can find monitors for older adults, for pregnant women, and for children. Make sure the one you choose is the right one for your age and situation.  The American Heart Association recommends an automatic cuff monitor that fits on your upper arm (bicep). The cuff should fit your arm size. A cuff that s too large or too small will not give an accurate reading. Measure around your upper arm to find your size.  Monitors that attach to your finger or wrist are not as accurate as monitors for your upper arm.  Ask your healthcare provider for help in choosing a monitor. Bring your monitor to your next provider visit if you need help in using it the correct way.  The steps below are general instructions for using an automatic digital monitor.  Step 1. Relax      Take your blood pressure at the same time every day, such as in the morning or evening, or at the time your healthcare provider  recommends.    Wait at least a half-hour after smoking, eating, or exercising. Don't drink coffee, tea, soda, or other caffeinated beverages before checking your blood pressure.    Sit comfortably at a table with both feet on the floor. Do not cross your legs or feet. Place the monitor near you.    Rest for a few minutes before you begin.  Step 2. Wrap the cuff      Place your arm on the table, palm up. Your arm should be at the level of your heart. Wrap the cuff around your upper arm, just above your elbow. It s best done on bare skin, not over clothing. Most cuffs will indicate where the brachial artery (the blood vessel in the middle of the arm at the inner side of the elbow) should line up with the cuff. Look in your monitor's instruction booklet for an illustration. You can also bring your cuff to your healthcare provider and have them show you how to correctly place the cuff.  Step 3. Inflate the cuff      Push the button that starts the pump.    The cuff will tighten, then loosen.    The numbers will change. When they stop changing, your blood pressure reading will appear.    Take 2 or 3 readings one minute apart.  Step 4. Write down the results of each reading      Write down your blood pressure numbers for each reading. Note the date and time. Keep your results in one place, such as a notebook. Even if your monitor has a built-in memory, keep a hard copy of the readings.    Remove the cuff from your arm. Turn off the machine.    Bring your blood pressure records with your healthcare providers at each visit.    If you start a new blood pressure medicine, note the day you started the new medicine. Also note the day if you change the dose of your medicine. This information goes on your blood pressure recording sheet. This will help your healthcare provider monitor how well the medicine changes are working.    Ask your healthcare provider what numbers should prompt you to call him or her. Also ask what numbers  should prompt you to get help right away.  Date Last Reviewed: 11/1/2016 2000-2017 [employer]. [address] [facility], [place] [address]. All rights reserved. This information is not intended as a substitute for professional medical care. Always follow your healthcare professional's instructions.        Pinched Nerve in the Neck  A pinched nerve in the neck (cervical radiculopathy) is caused when the nerve that goes from the spinal cord to the neck or arm is irritated or has pressure on it. This may be caused by a bulging spinal disk. A spinal disk is the cushion between each spinal bone. Or it may be caused by a narrowing of the spinal joint because of osteoarthritis and wear and tear from repeated injuries.  A pinched nerve can cause numbness, tingling, deep aching, or electrical shooting pain from the side of the neck all the way down to the fingers on one side.  A pinched nerve may start after a sudden turning or bending force (such as in a car accident) or after a simple awkward movement. In either case, muscle spasm is commonly present and adds to the pain.  Home care  Follow these guidelines when caring for yourself at home:    Rest and relax the muscles. Use a comfortable pillow that supports your head and keeps your spine in a natural (neutral) position. Your head shouldn t be tilted forward or backward. A rolled-up towel may help for a custom fit. When standing or sitting, keep your neck in line with your body. Keep your head up and shoulders down. Stay away from activities that require you to move your neck a lot.    You can use heat and massage to help ease the pain. Take a hot shower or bath, or use a heating pad. You can also use a cold pack for relief. You can make a cold pack by wrapping a plastic bag of crushed or cubed ice in a thin towel. Try both heat and cold, and use the method that feels best. Do this for 20 minutes several times a day.    You may use acetaminophen or  ibuprofen to control pain, unless another pain medicine was prescribed. If you have chronic liver or kidney disease, talk with your healthcare provider before using these medicines. Also talk with your provider if you ve had a stomach ulcer or gastrointestinal bleeding.    Reduce stress. Stress can make it longer for your pain to go away.    Do any exercises or stretches that were given to you as part of your discharge plan.    Wear a soft collar, if prescribed.    Physical therapy and massages are known to help.    You may need surgery for a more serious injury.  Follow-up care  Follow up with your healthcare provider, or as advised, if you don t start to get better after 1 week. You may need more tests. Tell your provider about any fever, chills, or weight loss.  If X-rays were taken, a radiologist may look at them. You will be told of any new findings that may affect your care.  When to seek medical advice  Call your healthcare provider right away if any of these occur:    Pain becomes worse even after taking prescribed pain medicine    Weakness in the arm or legs    Numbness in the arm gets worse    Trouble breathing or swallowing  Date Last Reviewed: 5/1/2017 2000-2017 [employer]. [address] [place] [address]. All rights reserved. This information is not intended as a substitute for professional medical care. Always follow your healthcare professional's instructions.        GERD (Adult)    The esophagus is a tube that carries food from the mouth to the stomach. A valve at the lower end of the esophagus prevents stomach acid from flowing upward. When this valve doesn't work properly, stomach contents may repeatedly flow back up (reflux) into the esophagus. This is called gastroesophageal reflux disease (GERD). GERD can irritate the esophagus. It can cause problems with swallowing or breathing. In severe cases, GERD can cause recurrent pneumonia or other serious problems.  Symptoms  "of reflux include burning, pressure or sharp pain in the upper abdomen or mid to lower chest. The pain can spread to the neck, back, or shoulder. There may be belching, an acid taste in the back of the throat, chronic cough, or sore throat or hoarseness. GERD symptoms often occur during the day after a big meal. They can also occur at night when lying down.   Home care  Lifestyle changes can help reduce symptoms. If needed, medicines may be prescribed. Symptoms often improve with treatment, but if treatment is stopped, the symptoms often return after a few months. So most persons with GERD will need to continue treatment.  Lifestyle changes    Limit or avoid fatty, fried, and spicy foods, as well as coffee, chocolate, mint, and foods with high acid content such as tomatoes and citrus fruit and juices (orange, grapefruit, lemon).    Don t eat large meals, especially at night. Frequent, smaller meals are best. Do not lie down right after eating. And don t eat anything 3 hours before going to bed.    Avoid drinking alcohol and smoking. As much as possible, stay away from second hand smoke.    If you are overweight, losing weight will reduce symptoms.     Avoid wearing tight clothing around your stomach area.    If your symptoms occur during sleep, use a foam wedge to elevate your upper body (not just your head.) Or, place 4\" blocks under the head of your bed.  Medicines  If needed, medicines can help relieve the symptoms of GERD and prevent damage to the esophagus. Discuss a medicine plan with your healthcare provider. This may include one or more of the following medicines:    Antacids to help neutralize the normal acids in your stomach.    Acid blockers (H2 blockers) to decrease acid production.    Acid inhibitors (PPIs) to decrease acid production in a different way than the blockers. They may work better, but can take a little longer to take effect.  Take an antacid 30-60 minutes after eating and at bedtime, but " not at the same time as an acid blocker.  Try not to take medicines such as ibuprofen and aspirin. If you are taking aspirin for your heart or other medical reasons, talk to your healthcare provider about stopping it.  Follow-up care  Follow up with your healthcare provider or as advised by our staff.  When to seek medical advice  Call your healthcare provider if any of the following occur:    Stomach pain gets worse or moves to the lower right abdomen (appendix area)    Chest pain appears or gets worse, or spreads to the back, neck, shoulder, or arm    Frequent vomiting (can t keep down liquids)    Blood in the stool or vomit (red or black in color)    Feeling weak or dizzy    Fever of 100.4 F (38 C) or higher, or as directed by your healthcare provider  Date Last Reviewed: 6/23/2015 2000-2017 [employer]. [address] [place] [address], [place] [address]. All rights reserved. This information is not intended as a substitute for professional medical care. Always follow your healthcare professional's instructions.        Lifestyle Changes for Controlling GERD  When you have GERD, stomach acid feels as if it s backing up toward your mouth. Whether or not you take medicine to control your GERD, your symptoms can often be improved with lifestyle changes. Talk to your healthcare provider about the following suggestions. These suggestions may help you get relief from your symptoms.      Raise your head  Reflux is more likely to strike when you re lying down flat, because stomach fluid can flow backward more easily. Raising the head of your bed 4 to 6 inches can help. To do this:    Slide blocks or books under the legs at the head of your bed. Or, place a wedge under the mattress. [employer] can make a suitable wedge for you. The wedge should run from your waist to the top of your head.    Don t just prop your head on several pillows. This increases pressure on your stomach. It can make GERD worse.  Watch  your eating habits  Certain foods may increase the acid in your stomach or relax the lower esophageal sphincter. This makes GERD more likely. It s best to avoid the following if they cause you symptoms:    Coffee, tea, and carbonated drinks (with and without caffeine)    Fatty, fried, or spicy food    Mint, chocolate, onions, and tomatoes    Peppermint    Any other foods that seem to irritate your stomach or cause you pain  Relieve the pressure  Tips include the following:    Eat smaller meals, even if you have to eat more often.    Don t lie down right after you eat. Wait a few hours for your stomach to empty.    Avoid tight belts and tight-fitting clothes.    Lose excess weight.  Tobacco and alcohol  Avoid smoking tobacco and drinking alcohol. They can make GERD symptoms worse.  Date Last Reviewed: 7/1/2016 2000-2017 [employer]. [address] [place] [address], [place] [address]. All rights reserved. This information is not intended as a substitute for professional medical care. Always follow your healthcare professional's instructions.

## 2018-02-16 NOTE — PROGRESS NOTES
"  SUBJECTIVE:   Keesha Norton is a 32 year old male who presents to clinic today for the following health issues:  ED/UC Followup:    Facility:  [facility] ED  Date of visit: 2/12/18  Reason for visit: Chest pain, High BP  Current Status: High BP according to his monitor, Feeling cold on left side, sometimes numbness on  Left arm, still has ringing in the ears when has BP high.     32 year old male presents with the above complaints. He states he's mostly here because he wants to compare his BP cuff from home with ours here because his BP has been running high. He was seen in emergency department for chest pain 4 days ago with a negative workup. He reports that everything seemed to start 1 month ago when he was seen for cerumen impaction and intermittent headache that started at posterior left neck. He reports he had an ear lavage which helped the decreased hearing but he can still hear \"blood flow\" in his ear at times. He states the headache radiates to his left arm and feels like nerve pain. It feels better when he applies pressure to the posterior head/neck. He reports he feels like he has cold hands and feet at times, worse on the left. On Monday he had left chest discomfort and left arm numbness. He had 2 EKGs and states he was having the chest discomfort during both. Both EKGs were normal. He states the chest discomfort sort of goes away when he burps. Food sometimes makes this chest discomfort worse. Sometimes gets bad taste in his mouth after eating. He has a history of GERD with h pylori dx in 2015 for which he had an upper GI and completed treated. BPs at home 130s-140s/60s-80s using his wrist blood pressure monitor. No shortness of breath, nausea, vomting, rash. No hx anxiety or depression, but states he feels more down during the winter months - takes vit D to help. Wife tells him he worries for no reason a lot. Works installing radon mitigation systems. Lives with wife, 2 children and 1 cat. Sleeps " 7-8 hours per night. Often wakes feeling unrested. No snoring or apnea. Doesn't exercise. No known neck or back injury. No neck stiffness. Tilting head up and down makes him more dizzy. Arm numbness resolves when wife massages his upper back. No cough or cold symptoms.    Problem list and histories reviewed & adjusted, as indicated.  Additional history: as documented    Patient Active Problem List   Diagnosis     CARDIOVASCULAR SCREENING; LDL GOAL LESS THAN 160     Vitamin D insufficiency     Elevated bilirubin     GERD (gastroesophageal reflux disease)     Gilbert's disease     Herpes zoster without complication     Right flank pain     Past Surgical History:   Procedure Laterality Date     ESOPHAGOSCOPY, GASTROSCOPY, DUODENOSCOPY (EGD), COMBINED N/A 2/13/2015    Procedure: COMBINED ESOPHAGOSCOPY, GASTROSCOPY, DUODENOSCOPY (EGD);  Surgeon: Jair Dickinson MD;  Location: [facility] OR     ESOPHAGOSCOPY, GASTROSCOPY, DUODENOSCOPY (EGD), COMBINED Left 2/13/2015    Procedure: COMBINED ESOPHAGOSCOPY, GASTROSCOPY, DUODENOSCOPY (EGD), BIOPSY SINGLE OR MULTIPLE;  Surgeon: Jair Dickinson MD;  Location: [facility] OR     ESOPHAGOSCOPY, GASTROSCOPY, DUODENOSCOPY (EGD), COMBINED N/A 4/6/2015    Procedure: COMBINED ESOPHAGOSCOPY, GASTROSCOPY, DUODENOSCOPY (EGD);  Surgeon: Arsen Bashir MD;  Location:  OR     HC TOOTH EXTRACTION W/FORCEP         Social History   Substance Use Topics     Smoking status: Never Smoker     Smokeless tobacco: Never Used     Alcohol use Yes      Comment: may once a month     Family History   Problem Relation Age of Onset     Hypertension Mother      Prostate Cancer Maternal Grandfather      Coronary Artery Disease Maternal Grandfather      CEREBROVASCULAR DISEASE Paternal Grandmother      Asthma Maternal Uncle      DIABETES Paternal Aunt      Cancer - colorectal No family hx of      Breast Cancer No family hx of      Thyroid Disease No family hx of      CANCER No family hx of      Glaucoma  No family hx of      Macular Degeneration No family hx of      Hyperlipidemia No family hx of      Ovarian Cancer No family hx of      Thyroid Disease No family hx of          Current Outpatient Prescriptions   Medication Sig Dispense Refill     ranitidine (ZANTAC) 150 MG tablet Take 1 tablet (150 mg) by mouth 2 times daily 60 tablet 1     Allergies   Allergen Reactions     Seasonal Allergies        Reviewed and updated as needed this visit by clinical staff  Tobacco  Allergies  Meds  Med Hx  Surg Hx  Fam Hx  Soc Hx      Reviewed and updated as needed this visit by Provider         ROS:  Constitutional, HEENT, cardiovascular, pulmonary, GI, , musculoskeletal, neuro, skin, endocrine and psych systems are negative, except as otherwise noted.    OBJECTIVE:     /82 (BP Location: Left arm, Patient Position: Chair, Cuff Size: Adult Regular)  Pulse 74  Temp 97  F (36.1  C) (Oral)  Wt 69.9 kg (154 lb)  SpO2 98%  BMI 21.8 kg/m2  Body mass index is 21.8 kg/(m^2).  GENERAL: healthy, alert and no distress  EYES: Eyes grossly normal to inspection, PERRL and conjunctivae and sclerae normal  HENT: ear canals and TM's normal, nose and mouth without ulcers or lesions  NECK: no adenopathy, no asymmetry, masses, or scars and thyroid normal to palpation  RESP: lungs clear to auscultation - no rales, rhonchi or wheezes  CV: regular rate and rhythm, normal S1 S2, no S3 or S4, no murmur, click or rub, no peripheral edema and peripheral pulses strong  ABDOMEN: soft, nontender, no hepatosplenomegaly, no masses and bowel sounds normal  MS: no gross musculoskeletal defects noted, no edema  SKIN: no suspicious lesions or rashes  NEURO: Normal strength and tone, mentation intact and speech normal  BACK: no CVA tenderness, no paralumbar tenderness  Comprehensive back pain exam:  No tenderness, Range of motion not limited by pain, Lower extremity strength functional and equal on both sides, Lower extremity reflexes within  normal limits bilaterally, Lower extremity sensation normal and equal on both sides and Straight leg raise negative bilaterally  PSYCH: mentation appears normal, affect normal/bright    Diagnostic Test Results:  none     ASSESSMENT/PLAN:     1. Arm numbness left  I think likely related to pinched nerve.     2. Chest discomfort  Workup completely negative for cardiac. Pulmonary etiology seems unlikely. I think likely related to GERD at this time. We discussed goals for BP. Also discussed that wrist monitors less accurate than arm    3. Gastroesophageal reflux disease, esophagitis presence not specified  Restart Prilosec.  - omeprazole (PRILOSEC) 20 MG CR capsule; Take 1 capsule (20 mg) by mouth daily  Dispense: 30 capsule; Refill: 1    See Patient Instructions    Your blood pressure is normal today  I think you likely have a pinched nerve in your neck/upper back causing your left arm symptoms. See the attached handout. If you have an increase in symptoms or they become bothersome, we can consider imaging.  Your chest discomfort has been determined not to be due to your heart from your most recent emergency department visit. I think it might be related to your acid reflux. Restart omeprazole 20 mg daily x 1 month.   If severe chest pain returns, shortness of breath, numbness/tingling call 911 or go to emergency department.  Otherwise I recommend follow up in 1 month.      [facility]

## 2018-02-17 ASSESSMENT — ANXIETY QUESTIONNAIRES: GAD7 TOTAL SCORE: 5

## 2018-02-17 ASSESSMENT — PATIENT HEALTH QUESTIONNAIRE - PHQ9: SUM OF ALL RESPONSES TO PHQ QUESTIONS 1-9: 7

## 2018-02-27 ENCOUNTER — OFFICE VISIT (OUTPATIENT)
Dept: PEDIATRICS | Facility: CLINIC | Age: 33
End: 2018-02-27
Payer: COMMERCIAL

## 2018-02-27 VITALS
DIASTOLIC BLOOD PRESSURE: 70 MMHG | SYSTOLIC BLOOD PRESSURE: 110 MMHG | BODY MASS INDEX: 22.11 KG/M2 | HEART RATE: 73 BPM | WEIGHT: 156.2 LBS | TEMPERATURE: 97.5 F | OXYGEN SATURATION: 100 %

## 2018-02-27 DIAGNOSIS — E55.9 VITAMIN D INSUFFICIENCY: ICD-10-CM

## 2018-02-27 DIAGNOSIS — R82.90 CLOUDY URINE: ICD-10-CM

## 2018-02-27 DIAGNOSIS — H93.13 TINNITUS, BILATERAL: ICD-10-CM

## 2018-02-27 DIAGNOSIS — D72.829 LEUKOCYTOSIS, UNSPECIFIED TYPE: ICD-10-CM

## 2018-02-27 DIAGNOSIS — R55 SYNCOPE, NEAR: Primary | ICD-10-CM

## 2018-02-27 LAB
ALBUMIN SERPL-MCNC: 4.2 G/DL (ref 3.4–5)
ALBUMIN UR-MCNC: NEGATIVE MG/DL
ALP SERPL-CCNC: 62 U/L (ref 40–150)
ALT SERPL W P-5'-P-CCNC: 16 U/L (ref 0–70)
ANION GAP SERPL CALCULATED.3IONS-SCNC: 8 MMOL/L (ref 3–14)
APPEARANCE UR: CLEAR
AST SERPL W P-5'-P-CCNC: 15 U/L (ref 0–45)
BILIRUB SERPL-MCNC: 3.4 MG/DL (ref 0.2–1.3)
BILIRUB UR QL STRIP: NEGATIVE
BUN SERPL-MCNC: 14 MG/DL (ref 7–30)
CALCIUM SERPL-MCNC: 9.3 MG/DL (ref 8.5–10.1)
CHLORIDE SERPL-SCNC: 103 MMOL/L (ref 94–109)
CO2 SERPL-SCNC: 28 MMOL/L (ref 20–32)
COLOR UR AUTO: YELLOW
CREAT SERPL-MCNC: 0.87 MG/DL (ref 0.66–1.25)
ERYTHROCYTE [DISTWIDTH] IN BLOOD BY AUTOMATED COUNT: 12.4 % (ref 10–15)
GFR SERPL CREATININE-BSD FRML MDRD: >90 ML/MIN/1.7M2
GLUCOSE SERPL-MCNC: 94 MG/DL (ref 70–99)
GLUCOSE UR STRIP-MCNC: NEGATIVE MG/DL
HCT VFR BLD AUTO: 48.5 % (ref 40–53)
HGB BLD-MCNC: 16.6 G/DL (ref 13.3–17.7)
HGB UR QL STRIP: ABNORMAL
KETONES UR STRIP-MCNC: NEGATIVE MG/DL
LEUKOCYTE ESTERASE UR QL STRIP: NEGATIVE
MCH RBC QN AUTO: 29.8 PG (ref 26.5–33)
MCHC RBC AUTO-ENTMCNC: 34.2 G/DL (ref 31.5–36.5)
MCV RBC AUTO: 87 FL (ref 78–100)
NITRATE UR QL: NEGATIVE
PH UR STRIP: 6 PH (ref 5–7)
PLATELET # BLD AUTO: 223 10E9/L (ref 150–450)
POTASSIUM SERPL-SCNC: 3.9 MMOL/L (ref 3.4–5.3)
PROT SERPL-MCNC: 7.8 G/DL (ref 6.8–8.8)
RBC # BLD AUTO: 5.57 10E12/L (ref 4.4–5.9)
RBC #/AREA URNS AUTO: NORMAL /HPF
SODIUM SERPL-SCNC: 139 MMOL/L (ref 133–144)
SOURCE: ABNORMAL
SP GR UR STRIP: 1.01 (ref 1–1.03)
TSH SERPL DL<=0.005 MIU/L-ACNC: 1.59 MU/L (ref 0.4–4)
UROBILINOGEN UR STRIP-MCNC: NORMAL MG/DL (ref 0–2)
WBC # BLD AUTO: 11.1 10E9/L (ref 4–11)
WBC #/AREA URNS AUTO: NORMAL /HPF

## 2018-02-27 PROCEDURE — 99214 OFFICE O/P EST MOD 30 MIN: CPT | Performed by: NURSE PRACTITIONER

## 2018-02-27 PROCEDURE — 82306 VITAMIN D 25 HYDROXY: CPT | Performed by: NURSE PRACTITIONER

## 2018-02-27 PROCEDURE — 36415 COLL VENOUS BLD VENIPUNCTURE: CPT | Performed by: NURSE PRACTITIONER

## 2018-02-27 PROCEDURE — 80053 COMPREHEN METABOLIC PANEL: CPT | Performed by: NURSE PRACTITIONER

## 2018-02-27 PROCEDURE — 84443 ASSAY THYROID STIM HORMONE: CPT | Performed by: NURSE PRACTITIONER

## 2018-02-27 PROCEDURE — 81001 URINALYSIS AUTO W/SCOPE: CPT | Performed by: NURSE PRACTITIONER

## 2018-02-27 PROCEDURE — 85027 COMPLETE CBC AUTOMATED: CPT | Performed by: NURSE PRACTITIONER

## 2018-02-27 NOTE — PATIENT INSTRUCTIONS
PLAN:   1.  Orders Placed This Encounter   Procedures     MR Brain w/o Contrast     UA reflex to Microscopic ([place]; [facility])     Urine Microscopic     CBC with platelets     Comprehensive metabolic panel     TSH with free T4 reflex     Zio Patch Holter       2. Patient needs to follow up in if no improvement,or sooner if worsening of symptoms or other symptoms develop.  FURTHER TESTING:       - MRI brain [phone]       - Holter   Will follow up and/or notify patient of  results via My Chart to determine further need for followup    It was a pleasure seeing you today at the [facility]. Thank you for allowing us to care for you today. We truly hope we provided you with the excellent service you deserve. Please let us know if there is anything else we can do for you so we can be sure you are leaving completley satisfied with your care experience.       General information about your clinic   Clinic Hours Lab Hours (Appointments are required)   Mon-Thurs: 7:30 AM - 7 PM Mon-Thurs: 7:30 AM - 7 PM   Fri: 7:30 AM - 5 PM Fri: 7:30 AM - 5 PM        After Hours Nurse Advise & Appts:  [facility]: [phone]  [facility] On Call: to make appointments anytime: [phone] On Call Physician: call [phone] and answering service will page the on call physician.        For urgent appointments, please call [phone] and ask for the triage nurse or your care team clinic nurse.  How to contact my care team:  [employer]: [email or web address]   Phone: [phone]   Fax: [phone]       [facility]:   Phone: [phone]  Hours: 8:00 AM - 6:00 PM  Medication Refills:  Call your pharmacy and they will forward the refill to us. Please allow 3 business days for your refills to be completed.       Normal or non-critical lab and imaging results will be communicated to you by MyChart, letter or phone within 7 days.  If you do not hear from us within 10 days,  please call the clinic. If you have a critical or abnormal lab result, we will notify you by phone as soon as possible.       We now have PWIC (Pediatric Walk in Care)  Monday-Friday from 7:30-4. Simply walk in and be seen for your urgent needs like cough, fever, rash, diarrhea or vomiting, pink eye, UTI. No appointments needed. Ask one of the team for more information      -Your Care Team:    Dr. Mando Samson - Internal Medicine/Pediatrics   Dr. Veto Parish - Family Medicine  Dr. Shaye Mejia - Pediatrics  Dr. Renata Carey - Pediatrics  Lucretia Ellis CNP - Family Practice Nurse Practitioner

## 2018-02-27 NOTE — PROGRESS NOTES
SUBJECTIVE:   Keesha Norton is a 32 year old male who presents to clinic today for the following health issues:    ED/UC Followup:    Facility:  [facility]  Date of visit: 2/12/18  Reason for visit: dizziness, chest pain  Current Status: still having dizziness, ringing in ears, urine still cloudy, headaches    MDM:   Keesha Norton is a 32 y.o. male who presents with chest pain over the last five days. Differential includes ACS. EKG is normal and negative troponin after many days of pain. Less likely pneumonia, aortic disease. He was seen and examined. His lab work returned and showed negative troponin, clear chest xray, and normal EKG. I had a long discussion with him. I do not think this is a PE as he has no risk factors for PE so he is PERC negative. I am going to go ahead and discharge him home and follow up with PCP. He is comfortable with that. He also had some issues with multiple symptoms of coolness of his left arm when he is outside and ringing in his ears. I do not think it has anything to do with tonight's presentation, however he can follow up with his PCP. He is comfortable with that plan.     Patient reports that about 6 weeks ago, he started having ringing in the left ear.  He said that he was found to have wax build-up in both ears, which was removed.  The ringing in the left ear slightly improved, but now has ringing in both ears, which has been daily and constant.  Meanwhile, he starting having headaches.  The headache is located primarily in the back of the pain, occurs most days, often on the left side, but may be both sides.  In this time, he has been experiencing what he thought was dizziness, however, what he describes is where his vision changes suddenly and gets weak.  Stress and anxiety makes the headaches worse.  He reports that his mother has anxiety.        Problem list and histories reviewed & adjusted, as indicated.  Additional history: as documented    Patient Active Problem List    Diagnosis     CARDIOVASCULAR SCREENING; LDL GOAL LESS THAN 160     Vitamin D insufficiency     Elevated bilirubin     GERD (gastroesophageal reflux disease)     Gilbert's disease     Herpes zoster without complication     Right flank pain     Past Surgical History:   Procedure Laterality Date     ESOPHAGOSCOPY, GASTROSCOPY, DUODENOSCOPY (EGD), COMBINED N/A 2/13/2015    Procedure: COMBINED ESOPHAGOSCOPY, GASTROSCOPY, DUODENOSCOPY (EGD);  Surgeon: Jair Dickinson MD;  Location: [facility]     ESOPHAGOSCOPY, GASTROSCOPY, DUODENOSCOPY (EGD), COMBINED Left 2/13/2015    Procedure: COMBINED ESOPHAGOSCOPY, GASTROSCOPY, DUODENOSCOPY (EGD), BIOPSY SINGLE OR MULTIPLE;  Surgeon: Jair Dickinson MD;  Location: [facility]     ESOPHAGOSCOPY, GASTROSCOPY, DUODENOSCOPY (EGD), COMBINED N/A 4/6/2015    Procedure: COMBINED ESOPHAGOSCOPY, GASTROSCOPY, DUODENOSCOPY (EGD);  Surgeon: Arsen Bashir MD;  Location: [facility] OR     HC TOOTH EXTRACTION W/FORCEP         Social History   Substance Use Topics     Smoking status: Never Smoker     Smokeless tobacco: Never Used     Alcohol use Yes      Comment: 2-3 drinks a week     Family History   Problem Relation Age of Onset     Hypertension Mother      Prostate Cancer Maternal Grandfather      Coronary Artery Disease Maternal Grandfather      CEREBROVASCULAR DISEASE Paternal Grandmother      Asthma Maternal Uncle      DIABETES Paternal Aunt      Cancer - colorectal No family hx of      Breast Cancer No family hx of      Thyroid Disease No family hx of      CANCER No family hx of      Glaucoma No family hx of      Macular Degeneration No family hx of      Hyperlipidemia No family hx of      Ovarian Cancer No family hx of      Thyroid Disease No family hx of          Current Outpatient Prescriptions   Medication Sig Dispense Refill     omeprazole (PRILOSEC) 20 MG CR capsule Take 1 capsule (20 mg) by mouth daily (Patient not taking: Reported on 2/27/2018) 30 capsule 1     ranitidine  (ZANTAC) 150 MG tablet Take 1 tablet (150 mg) by mouth 2 times daily (Patient not taking: Reported on 2/27/2018) 60 tablet 1     Allergies   Allergen Reactions     Seasonal Allergies      BP Readings from Last 3 Encounters:   02/27/18 110/70   02/16/18 122/82   02/12/18 (!) 155/93    Wt Readings from Last 3 Encounters:   02/27/18 156 lb 3.2 oz (70.9 kg)   02/16/18 154 lb (69.9 kg)   02/12/18 159 lb (72.1 kg)         Labs reviewed in EPIC    Reviewed and updated as needed this visit by clinical staff  Tobacco  Allergies  Meds  Med Hx  Surg Hx  Fam Hx  Soc Hx      Reviewed and updated as needed this visit by Provider         ROS:  CONSTITUTIONAL:NEGATIVE for fever, chills, change in weight  EYES: NEGATIVE for vision changes or irritation  ENT/MOUTH: NEGATIVE for ear, mouth and throat problems and POSITIVE for seasonal allergies   RESP:NEGATIVE for significant cough or SOB  CV: NEGATIVE for chest pain, palpitations or peripheral edema, POSITIVE for syncope or near-syncope  MUSCULOSKELETAL: NEGATIVE for significant arthralgias or myalgia  NEURO: NEGATIVE for weakness, dizziness or paresthesias and POSITIVE for dizziness/lightheadedness  HEME/ALLERGY/IMMUNE: NEGATIVE for bleeding problems  PSYCHIATRIC: NEGATIVE for changes in mood or affect and POSITIVE foranxiety    OBJECTIVE:     /70 (BP Location: Right arm, Patient Position: Sitting, Cuff Size: Adult Regular)  Pulse 73  Temp 97.5  F (36.4  C) (Temporal)  Wt 156 lb 3.2 oz (70.9 kg)  SpO2 100%  BMI 22.11 kg/m2  Body mass index is 22.11 kg/(m^2).  GENERAL: healthy, alert and no distress  EYES: Eyes grossly normal to inspection, PERRL and conjunctivae and sclerae normal  EYES: PERRL, EOMI and bilateral scleral injection  HENT: ceruminosis bilateral ear canals; external canals cleared of cerumen, TM's with serous effusion bilaterally, nasal mucosa erythematous, oropharynx erythematous, no exudate, mouth without ulcers or lesions,  NECK: no adenopathy, no  asymmetry, masses, or scars and thyroid normal to palpation; no carotid thrill or bruit appreciated  RESP: lungs clear to auscultation - no rales, rhonchi or wheezes  CV: regular rate and rhythm, normal S1 S2, no S3 or S4, no murmur, click or rub, no peripheral edema and peripheral pulses strong  MS: no gross musculoskeletal defects noted, no edema  SKIN: no suspicious lesions or rashes  NEURO: Normal strength and tone, mentation intact and speech normal; neg romberg   PSYCH: mentation appears normal, affect normal/bright    Results for orders placed or performed in visit on 02/27/18   UA reflex to Microscopic ([place]; [facility])   Result Value Ref Range    Color Urine Yellow     Appearance Urine Clear     Glucose Urine Negative NEG^Negative mg/dL    Bilirubin Urine Negative NEG^Negative    Ketones Urine Negative NEG^Negative mg/dL    Specific Gravity Urine 1.014 1.003 - 1.035    Blood Urine Small (A) NEG^Negative    pH Urine 6.0 5.0 - 7.0 pH    Protein Albumin Urine Negative NEG^Negative mg/dL    Urobilinogen mg/dL Normal 0.0 - 2.0 mg/dL    Nitrite Urine Negative NEG^Negative    Leukocyte Esterase Urine Negative NEG^Negative    Source Midstream Urine    Urine Microscopic   Result Value Ref Range    WBC Urine O - 2 OTO2^O - 2 /HPF    RBC Urine O - 2 OTO2^O - 2 /HPF   CBC with platelets   Result Value Ref Range    WBC 11.1 (H) 4.0 - 11.0 10e9/L    RBC Count 5.57 4.4 - 5.9 10e12/L    Hemoglobin 16.6 13.3 - 17.7 g/dL    Hematocrit 48.5 40.0 - 53.0 %    MCV 87 78 - 100 fl    MCH 29.8 26.5 - 33.0 pg    MCHC 34.2 31.5 - 36.5 g/dL    RDW 12.4 10.0 - 15.0 %    Platelet Count 223 150 - 450 10e9/L   Comprehensive metabolic panel   Result Value Ref Range    Sodium 139 133 - 144 mmol/L    Potassium 3.9 3.4 - 5.3 mmol/L    Chloride 103 94 - 109 mmol/L    Carbon Dioxide 28 20 - 32 mmol/L    Anion Gap 8 3 - 14 mmol/L    Glucose 94 70 - 99 mg/dL    Urea Nitrogen 14 7 - 30 mg/dL    Creatinine 0.87 0.66 - 1.25 mg/dL     GFR Estimate >90 >60 mL/min/1.7m2    GFR Estimate If Black >90 >60 mL/min/1.7m2    Calcium 9.3 8.5 - 10.1 mg/dL    Bilirubin Total 3.4 (H) 0.2 - 1.3 mg/dL    Albumin 4.2 3.4 - 5.0 g/dL    Protein Total 7.8 6.8 - 8.8 g/dL    Alkaline Phosphatase 62 40 - 150 U/L    ALT 16 0 - 70 U/L    AST 15 0 - 45 U/L   TSH with free T4 reflex   Result Value Ref Range    TSH 1.59 0.40 - 4.00 mU/L   Vitamin D Deficiency   Result Value Ref Range    Vitamin D Deficiency screening 19 (L) 20 - 75 ug/L       ASSESSMENT/PLAN:     PLAN:   1.  Orders Placed This Encounter   Procedures     MR Brain w/o Contrast     UA reflex to Microscopic ([place]; [facility])     Urine Microscopic     CBC with platelets     Comprehensive metabolic panel     TSH with free T4 reflex     Zio Patch Holter       2. Patient needs to follow up in if no improvement,or sooner if worsening of symptoms or other symptoms develop.  FURTHER TESTING:       - MRI brain [phone]       - Holter   - may take OTC antihistamine (claritin, allegra, zyrtec or generic equivalent) for fluid behind the eardrums.        Will follow up and/or notify patient of  results via My Chart to determine further need for follow-up.    Alon Long RN NP Student  I have examined the patient and agree with written evaluation. Assessment and plan presented by this provider.   BEBETO Umana, MORRIS TATE  [facility]

## 2018-02-27 NOTE — NURSING NOTE
"Chief Complaint   Patient presents with     ER F/U     follow-up from [facility] for dizziness, chest pain       Initial /70 (BP Location: Right arm, Patient Position: Sitting, Cuff Size: Adult Regular)  Pulse 73  Temp 97.5  F (36.4  C) (Temporal)  Wt 156 lb 3.2 oz (70.9 kg)  SpO2 100%  BMI 22.11 kg/m2 Estimated body mass index is 22.11 kg/(m^2) as calculated from the following:    Height as of 2/12/18: 5' 10.47\" (1.79 m).    Weight as of this encounter: 156 lb 3.2 oz (70.9 kg).  Medication Reconciliation: complete      DARIAN Britt      "

## 2018-02-27 NOTE — MR AVS SNAPSHOT
After Visit Summary   2/27/2018    Keesha Norton    MRN: [id number]           Patient Information     Date Of Birth          1985        Visit Information        Provider Department      2/27/2018 3:50 PM Lucretia Ellis APRN CNP [facility]        Today's Diagnoses     Syncope, near    -  1    Cloudy urine        Tinnitus, bilateral        Vitamin D insufficiency          Care Instructions    PLAN:   1.  Orders Placed This Encounter   Procedures     MR Brain w/o Contrast     UA reflex to Microscopic ([place]; [facility])     Urine Microscopic     CBC with platelets     Comprehensive metabolic panel     TSH with free T4 reflex     Zio Patch Holter       2. Patient needs to follow up in if no improvement,or sooner if worsening of symptoms or other symptoms develop.  FURTHER TESTING:       - MRI brain [phone]       - Holter   Will follow up and/or notify patient of  results via My Chart to determine further need for followup    It was a [facility] today at [facility]. Thank you for allowing us to care for you today. We truly hope we provided you with the excellent service you deserve. Please let us know if there is anything else we can do for you so we can be sure you are leaving completley satisfied with your care experience.       General information about your clinic   Clinic Hours Lab Hours (Appointments are required)   Mon-Thurs: 7:30 AM - 7 PM Mon-Thurs: 7:30 AM - 7 PM   Fri: 7:30 AM - 5 PM Fri: 7:30 AM - 5 PM        After Hours Nurse Advise & Appts:  [facility]: [phone]  [facility] On Call: to make appointments anytime: [phone] On Call Physician: call [phone] and answering service will page the on call physician.        For urgent appointments, please call [phone] and ask for the triage nurse or your care team clinic nurse.  How to contact my care team:  Jana:  [email or web address]   Phone: [phone]   Fax: [phone]       [facility]:   Phone: [phone]  Hours: 8:00 AM - 6:00 PM  Medication Refills:  Call your pharmacy and they will forward the refill to us. Please allow 3 business days for your refills to be completed.       Normal or non-critical lab and imaging results will be communicated to you by MyChart, letter or phone within 7 days.  If you do not hear from us within 10 days, please call the clinic. If you have a critical or abnormal lab result, we will notify you by phone as soon as possible.       We now have PWIC (Pediatric Walk in Care)  Monday-Friday from 7:30-4. Simply walk in and be seen for your urgent needs like cough, fever, rash, diarrhea or vomiting, pink eye, UTI. No appointments needed. Ask one of the team for more information      -Your Care Team:    Dr. Mando Samson - Internal Medicine/Pediatrics   Dr. Veto Parish - Family Medicine  Dr. Shaye Mejia - Pediatrics  Dr. Renata Carey - Pediatrics  Lucretia Ellis CNP - Family Practice Nurse Practitioner                         Follow-ups after your visit        Future tests that were ordered for you today     Open Future Orders        Priority Expected Expires Ordered    Zio Patch Holter Routine  4/13/2018 2/27/2018    MR Brain w/o Contrast Routine  2/27/2019 2/27/2018            Who to contact     If you have questions or need follow up information about today's clinic visit or your schedule please contact [facility] directly at [phone].  Normal or non-critical lab and imaging results will be communicated to you by MyChart, letter or phone within 4 business days after the clinic has received the results. If you do not hear from us within 7 days, please contact the clinic through [employer] or phone. If you have a critical or abnormal lab result, we will notify you by phone as soon as possible.  Submit refill requests through [employer] or call your pharmacy and  they will forward the refill request to us. Please allow 3 business days for your refill to be completed.          Additional Information About Your Visit        [employer] Navigation Information     [employer] gives you secure access to your electronic health record. If you see a primary care provider, you can also send messages to your care team and make appointments. If you have questions, please call your primary care clinic.  If you do not have a primary care provider, please call [phone] and they will assist you.      [employer] is an electronic gateway that provides easy, online access to your medical records. With [employer], you can request a clinic appointment, read your test results, renew a prescription or communicate with your care team.     To access your existing account, please contact your [facility] or call [phone] for assistance.        Care EveryWhere ID     This is your Care EveryWhere ID. This could be used by other organizations to access your [facility] medical records  [id number]        Your Vitals Were     Pulse Temperature Pulse Oximetry BMI (Body Mass Index)          73 97.5  F (36.4  C) (Temporal) 100% 22.11 kg/m2         Blood Pressure from Last 3 Encounters:   02/27/18 110/70   02/16/18 122/82   02/12/18 (!) 155/93    Weight from Last 3 Encounters:   02/27/18 156 lb 3.2 oz (70.9 kg)   02/16/18 154 lb (69.9 kg)   02/12/18 159 lb (72.1 kg)              We Performed the Following     CBC with platelets     Comprehensive metabolic panel     TSH with free T4 reflex     UA reflex to Microscopic ([place]; [facility])     Urine Microscopic     Vitamin D Deficiency        Primary Care Provider Office Phone # Fax #    Zainab Lrbalaji Fish [phone]       [id number]  [facility] [id number]        Equal Access to Services     MARIA ELENA MACIEL : rosio Reis, jeffery herrera  stewart roysamuel lanAn Mariejonna ah. So [facility] [phone].    ATENCIÓN: Si habla español, tiene a [place] disposición servicios gratuitos de asistencia lingüística. Airam al [phone].    We comply with applicable federal civil rights laws and [place] laws. We do not discriminate on the basis of race, color, national origin, age, disability, sex, sexual orientation, or gender identity.            Thank you!     Thank you for choosing [facility]  for your care. Our goal is always to provide you with excellent care. Hearing back from our patients is one way we can continue to improve our services. Please take a few minutes to complete the written survey that you may receive in the mail after your visit with us. Thank you!             Your Updated Medication List - Protect others around you: Learn how to safely use, store and throw away your medicines at www.disposemymeds.org.          This list is accurate as of 2/27/18  5:11 PM.  Always use your most recent med list.                   Brand Name Dispense Instructions for use Diagnosis    omeprazole 20 MG CR capsule    priLOSEC    30 capsule    Take 1 capsule (20 mg) by mouth daily    Gastroesophageal reflux disease, esophagitis presence not specified       ranitidine 150 MG tablet    ZANTAC    60 tablet    Take 1 tablet (150 mg) by mouth 2 times daily    Gastroesophageal reflux disease without esophagitis

## 2018-02-28 LAB — DEPRECATED CALCIDIOL+CALCIFEROL SERPL-MC: 19 UG/L (ref 20–75)

## 2018-03-01 RX ORDER — ERGOCALCIFEROL 1.25 MG/1
50000 CAPSULE, LIQUID FILLED ORAL
Qty: 8 CAPSULE | Refills: 0 | Status: SHIPPED | OUTPATIENT
Start: 2018-03-01 | End: 2018-04-20

## 2018-03-01 NOTE — PROGRESS NOTES
Eliot Norton,    Attached are your test results.  -Liver and gallbladder tests are normal. (ALT,AST, Alk phos, bilirubin), kidney function is normal (Cr, GFR), Sodium is normal, Potassium is normal, Calcium is normal, Glucose is normal (diabetes screening test).   -Cholesterol levels (LDL,HDL, Triglycerides) are normal.  ADVISE: rechecking in 1 year.  -TSH (thyroid stimulating hormone) level is normal which indicates normal thyroid function.  -Normal red blood cell (hgb) levels, very slight  white blood cell count and normal platelet levels.  Vitamin D level is low and oral supplementation should be started.  ADVISE: start script for Vitamin D once a week for 8 weeks and then Vitamin D3 over the counter 2000 IU daily to maintain levels. I will send the script in for you.   In 2 months, please schedule a lab only appointment to recheck Vitamin D levels (Vit D deficiency, DX: Vitamin D Deficiency)     Please contact us if you have any questions.    Lucretia Ellis, CNP

## 2018-03-12 ENCOUNTER — RADIANT APPOINTMENT (OUTPATIENT)
Dept: MRI IMAGING | Facility: CLINIC | Age: 33
End: 2018-03-12
Attending: NURSE PRACTITIONER
Payer: COMMERCIAL

## 2018-03-12 DIAGNOSIS — H93.13 TINNITUS, BILATERAL: ICD-10-CM

## 2018-03-12 PROCEDURE — A9585 GADOBUTROL INJECTION: HCPCS | Performed by: RADIOLOGY

## 2018-03-12 PROCEDURE — 70553 MRI BRAIN STEM W/O & W/DYE: CPT | Performed by: RADIOLOGY

## 2018-03-12 RX ORDER — GADOBUTROL 604.72 MG/ML
7.5 INJECTION INTRAVENOUS ONCE
Status: COMPLETED | OUTPATIENT
Start: 2018-03-12 | End: 2018-03-12

## 2018-03-12 RX ADMIN — GADOBUTROL 7 ML: 604.72 INJECTION INTRAVENOUS at 15:05

## 2018-03-12 NOTE — PROGRESS NOTES
Eliot Norton,    Attached are your test results.  MRI of the brain is normal    Please contact us if you have any questions.    Lucretia Ellis, CNP

## 2018-06-15 ENCOUNTER — RADIANT APPOINTMENT (OUTPATIENT)
Dept: GENERAL RADIOLOGY | Facility: CLINIC | Age: 33
End: 2018-06-15
Attending: FAMILY MEDICINE
Payer: COMMERCIAL

## 2018-06-15 ENCOUNTER — OFFICE VISIT (OUTPATIENT)
Dept: FAMILY MEDICINE | Facility: CLINIC | Age: 33
End: 2018-06-15
Payer: COMMERCIAL

## 2018-06-15 VITALS
BODY MASS INDEX: 22.75 KG/M2 | HEART RATE: 93 BPM | HEIGHT: 70 IN | DIASTOLIC BLOOD PRESSURE: 72 MMHG | OXYGEN SATURATION: 99 % | WEIGHT: 158.9 LBS | SYSTOLIC BLOOD PRESSURE: 121 MMHG | TEMPERATURE: 98.4 F

## 2018-06-15 DIAGNOSIS — R31.29 MICROHEMATURIA: ICD-10-CM

## 2018-06-15 DIAGNOSIS — R10.9 FLANK PAIN: ICD-10-CM

## 2018-06-15 DIAGNOSIS — Z87.11 HISTORY OF GASTRIC ULCER: ICD-10-CM

## 2018-06-15 DIAGNOSIS — R30.0 DYSURIA: Primary | ICD-10-CM

## 2018-06-15 DIAGNOSIS — R59.0 POSTERIOR CERVICAL ADENOPATHY: ICD-10-CM

## 2018-06-15 DIAGNOSIS — R17 ELEVATED BILIRUBIN: ICD-10-CM

## 2018-06-15 LAB
ALBUMIN UR-MCNC: NEGATIVE MG/DL
APPEARANCE UR: CLEAR
BACTERIA #/AREA URNS HPF: ABNORMAL /HPF
BILIRUB UR QL STRIP: NEGATIVE
COLOR UR AUTO: YELLOW
CRP SERPL-MCNC: <2.9 MG/L (ref 0–8)
ERYTHROCYTE [SEDIMENTATION RATE] IN BLOOD BY WESTERGREN METHOD: 5 MM/H (ref 0–15)
FERRITIN SERPL-MCNC: 247 NG/ML (ref 26–388)
GLUCOSE UR STRIP-MCNC: NEGATIVE MG/DL
HGB UR QL STRIP: ABNORMAL
IRON SATN MFR SERPL: 28 % (ref 15–46)
IRON SERPL-MCNC: 83 UG/DL (ref 35–180)
KETONES UR STRIP-MCNC: NEGATIVE MG/DL
LEUKOCYTE ESTERASE UR QL STRIP: NEGATIVE
MUCOUS THREADS #/AREA URNS LPF: PRESENT /LPF
NITRATE UR QL: NEGATIVE
NON-SQ EPI CELLS #/AREA URNS LPF: ABNORMAL /LPF
PH UR STRIP: 6 PH (ref 5–7)
RBC #/AREA URNS AUTO: ABNORMAL /HPF
RETICS # AUTO: 68.1 10E9/L (ref 25–95)
RETICS/RBC NFR AUTO: 1.2 % (ref 0.5–2)
SOURCE: ABNORMAL
SP GR UR STRIP: 1.02 (ref 1–1.03)
TIBC SERPL-MCNC: 295 UG/DL (ref 240–430)
UROBILINOGEN UR STRIP-ACNC: 0.2 EU/DL (ref 0.2–1)
WBC #/AREA URNS AUTO: ABNORMAL /HPF

## 2018-06-15 PROCEDURE — 83540 ASSAY OF IRON: CPT | Performed by: FAMILY MEDICINE

## 2018-06-15 PROCEDURE — 86618 LYME DISEASE ANTIBODY: CPT | Performed by: FAMILY MEDICINE

## 2018-06-15 PROCEDURE — 87389 HIV-1 AG W/HIV-1&-2 AB AG IA: CPT | Performed by: FAMILY MEDICINE

## 2018-06-15 PROCEDURE — 36415 COLL VENOUS BLD VENIPUNCTURE: CPT | Performed by: FAMILY MEDICINE

## 2018-06-15 PROCEDURE — 83550 IRON BINDING TEST: CPT | Performed by: FAMILY MEDICINE

## 2018-06-15 PROCEDURE — 85045 AUTOMATED RETICULOCYTE COUNT: CPT | Performed by: FAMILY MEDICINE

## 2018-06-15 PROCEDURE — 83516 IMMUNOASSAY NONANTIBODY: CPT | Mod: 90 | Performed by: FAMILY MEDICINE

## 2018-06-15 PROCEDURE — 82728 ASSAY OF FERRITIN: CPT | Performed by: FAMILY MEDICINE

## 2018-06-15 PROCEDURE — 86140 C-REACTIVE PROTEIN: CPT | Performed by: FAMILY MEDICINE

## 2018-06-15 PROCEDURE — 87591 N.GONORRHOEAE DNA AMP PROB: CPT | Performed by: FAMILY MEDICINE

## 2018-06-15 PROCEDURE — 85060 BLOOD SMEAR INTERPRETATION: CPT | Performed by: FAMILY MEDICINE

## 2018-06-15 PROCEDURE — 85025 COMPLETE CBC W/AUTO DIFF WBC: CPT | Performed by: FAMILY MEDICINE

## 2018-06-15 PROCEDURE — 82103 ALPHA-1-ANTITRYPSIN TOTAL: CPT | Performed by: FAMILY MEDICINE

## 2018-06-15 PROCEDURE — 85652 RBC SED RATE AUTOMATED: CPT | Performed by: FAMILY MEDICINE

## 2018-06-15 PROCEDURE — 87340 HEPATITIS B SURFACE AG IA: CPT | Performed by: FAMILY MEDICINE

## 2018-06-15 PROCEDURE — 81001 URINALYSIS AUTO W/SCOPE: CPT | Performed by: FAMILY MEDICINE

## 2018-06-15 PROCEDURE — 86038 ANTINUCLEAR ANTIBODIES: CPT | Performed by: FAMILY MEDICINE

## 2018-06-15 PROCEDURE — 99000 SPECIMEN HANDLING OFFICE-LAB: CPT | Performed by: FAMILY MEDICINE

## 2018-06-15 PROCEDURE — 71046 X-RAY EXAM CHEST 2 VIEWS: CPT | Mod: FY

## 2018-06-15 PROCEDURE — 87086 URINE CULTURE/COLONY COUNT: CPT | Performed by: FAMILY MEDICINE

## 2018-06-15 PROCEDURE — 99214 OFFICE O/P EST MOD 30 MIN: CPT | Performed by: FAMILY MEDICINE

## 2018-06-15 PROCEDURE — 87491 CHLMYD TRACH DNA AMP PROBE: CPT | Performed by: FAMILY MEDICINE

## 2018-06-15 PROCEDURE — 82390 ASSAY OF CERULOPLASMIN: CPT | Performed by: FAMILY MEDICINE

## 2018-06-15 PROCEDURE — 86803 HEPATITIS C AB TEST: CPT | Performed by: FAMILY MEDICINE

## 2018-06-15 PROCEDURE — 83516 IMMUNOASSAY NONANTIBODY: CPT | Mod: 59 | Performed by: FAMILY MEDICINE

## 2018-06-15 NOTE — PROGRESS NOTES
"  SUBJECTIVE:   Keesha Norton is a 33 year old male who presents to clinic today for the following health issues:    Genitourinary symptoms      Duration: 2 weeks    Description:  Dysuria and back pain    Intensity:  moderate    Accompanying signs and symptoms (fever/discharge/nausea/vomiting/back or abdominal pain):  Vomiting, left sided abdominal pain and burning, cold sensation in penis, general weakness, pain and swelling left jaw and back of neck (also notes that he thinks he was bitten by a bug which may be where part of the swelling stems from)    History (frequent UTI's/kidney stones/prostate problems): UTI a few months before  Sexually active: YES    Precipitating or alleviating factors: none    Therapies tried and outcome: tea and herbal things , cranberry juice - not helpful    Past medical, family, and social histories, medications, and allergies are reviewed and updated in Epic.     ROS:  CONSTITUTIONAL: NEGATIVE for fever, chills, change in weight  ENT/MOUTH: POSITIVE for neck/lymph node tenderness. NEGATIVE for ear, nose and mouth problems.  RESP: NEGATIVE for significant cough or SOB  CV: NEGATIVE for chest pain, palpitations or peripheral edema  ROS otherwise negative    This document serves as a record of the services and decisions personally performed and made by Dr. John. It was created on his behalf by Alisa Moore, a trained medical scribe. The creation of this document is based the provider's statements to the medical scribe.  Alisa Moore Zayra 15, 2018 4:30 PM     OBJECTIVE:                                                    /72 (BP Location: Left arm, Patient Position: Chair, Cuff Size: Adult Regular)  Pulse 93  Temp 98.4  F (36.9  C) (Oral)  Ht 1.79 m (5' 10.47\")  Wt 72.1 kg (158 lb 14.4 oz)  SpO2 99%  BMI 22.5 kg/m2   Body mass index is 22.5 kg/(m^2).   GENERAL: healthy, alert and no distress  EYES: Eyes grossly normal to inspection, PERRL and conjunctivae and sclerae " normal  HENT: right ear: normal: no effusions, no erythema, normal landmarks, left ear: normal: no effusions, no erythema, normal landmarks, nose and mouth without ulcers or lesions, oropharynx clear and oral mucous membranes moist  RESP: lungs clear to auscultation - no rales, rhonchi or wheezes  CV: regular rate and rhythm, normal S1 S2, no S3 or S4, no murmur, click or rub, no peripheral edema and peripheral pulses strong  ABDOMEN: soft, nontender, no hepatosplenomegaly, no masses and bowel sounds normal  SKIN: no suspicious lesions or rashes. Oliver on center of posterior near consistent with bug bite.  NEURO: Normal strength and tone, sensory exam grossly normal, mentation intact and cranial nerves 2-12 intact  PSYCH: mentation appears normal, affect normal/bright  LYMPH: Left posterior cervical nodes palpable lower near the shoulder. Slight prominence in the left supraclavicular area compared to the right, but nothing discrete    Diagnostic Test Results:  Results for orders placed or performed in visit on 06/15/18 (from the past 24 hour(s))   UA reflex to Microscopic and Culture   Result Value Ref Range    Color Urine Yellow     Appearance Urine Clear     Glucose Urine Negative NEG^Negative mg/dL    Bilirubin Urine Negative NEG^Negative    Ketones Urine Negative NEG^Negative mg/dL    Specific Gravity Urine 1.025 1.003 - 1.035    Blood Urine Small (A) NEG^Negative    pH Urine 6.0 5.0 - 7.0 pH    Protein Albumin Urine Negative NEG^Negative mg/dL    Urobilinogen Urine 0.2 0.2 - 1.0 EU/dL    Nitrite Urine Negative NEG^Negative    Leukocyte Esterase Urine Negative NEG^Negative    Source Midstream Urine    Urine Microscopic   Result Value Ref Range    WBC Urine 0 - 5 OTO5^0 - 5 /HPF    RBC Urine 2-5 (A) OTO2^O - 2 /HPF    Squamous Epithelial /LPF Urine Few FEW^Few /LPF    Bacteria Urine Few (A) NEG^Negative /HPF    Mucous Urine Present (A) NEG^Negative /LPF        ASSESSMENT/PLAN:                                                       (R30.0) Dysuria  (primary encounter diagnosis)  Comment: a chronic intermittent complaint. With the two symptoms listed below I want to be sure to rule out kidney stones  Plan: UA reflex to Microscopic and Culture, Urine         Microscopic, Urine Culture Aerobic Bacterial,         Chlamydia trachomatis PCR, Neisseria         gonorrhoeae PCR, CT Abdomen Pelvis w/o         Contrast, Anti Nuclear Anabell IgG by IFA with         Reflex          (R31.29) Microhematuria  Comment:   Plan: Urine Culture Aerobic Bacterial, CT Abdomen         Pelvis w/o Contrast          (R10.9) Flank pain  Comment:   Plan: Urine Culture Aerobic Bacterial, CT Abdomen         Pelvis w/o Contrast, Lyme Disease Anabell with         reflex to WB Serum, ESR: Erythrocyte         sedimentation rate, CRP inflammation          (R59.0) Posterior cervical adenopathy  Comment: unknown significance, perhaps associated with the bug bite on the back of his neck  Plan: HIV Antigen Antibody Combo, CT Abdomen Pelvis         w/o Contrast, ESR: Erythrocyte sedimentation         rate, CRP inflammation, XR Chest 2 Views, Blood        Morphology Pathologist Review, CBC with         platelets differential, Reticulocyte Count          (R17) Elevated bilirubin  Comment: probably Gilbert's syndrome, but I think he should have the complete workup  Plan: HIV Antigen Antibody Combo, Hepatitis C Screen         Reflex to HCV RNA Quant and Genotype, Hepatitis        B surface antigen, CT Abdomen Pelvis w/o         Contrast, Alpha-1-antitrypsin total,         Ceruloplasmin, F Actin EIA with reflex,         Ferritin, Iron and iron binding capacity,         Mitochondrial M2 Antibody IgG            The information in this document, created by the medical scribe for me, accurately reflects the services I personally performed and the decisions made by me. I have reviewed and approved this document for accuracy prior to leaving the patient care area. Zayra 15, 2018 4:30 PM      Lalo John MD

## 2018-06-15 NOTE — MR AVS SNAPSHOT
After Visit Summary   6/15/2018    Keesha Norton    MRN: [id number]           Patient Information     Date Of Birth          1985        Visit Information        Provider Department      6/15/2018 4:00 PM Lalo John MD [facility]        Today's Diagnoses     Dysuria    -  1    Microhematuria        Flank pain        Posterior cervical adenopathy        Elevated bilirubin          Care Instructions    At [facility], we strive to deliver an exceptional experience to you, every time we see you.  If you receive a survey in the mail, please send us back your thoughts. We really do value your feedback.    Based on your medical history, these are the current health maintenance/preventive care services that you are due for (some may have been done at this visit.)  Health Maintenance Due   Topic Date Due     HIV SCREEN (SYSTEM ASSIGNED)  05/25/2003         Suggested websites for health information:  [employer] : Up to date and easily searchable information on multiple topics.  Www.medlineplus.gov : medication info, interactive tutorials, watch real surgeries online  Www.familydoctor.org : good info from the Academy of Family Physicians  Www.cdc.gov : public health info, travel advisories, epidemics (H1N1)  Www.aap.org : children's health info, normal development, vaccinations  Www.health.state[place]us : [place] dept of health, public health issues in [place], N1N1    Your care team:                            Family Medicine Internal Medicine   MD Jose Manuel Patel MD Shantel Branch-Fleming, MD Katya Georgiev PA-C Nam Ho, MD Pediatrics   ADITYA Cruz, BEBETO Lewis APRN MD Sanam Bullock MD Deborah Mielke, MD Kim Thein, APRN CNP      Clinic hours: Monday - Thursday 7 am-7 pm; Fridays 7 am-5 pm.   Urgent care: Monday - Friday 11 am-9 pm; Saturday and Sunday 9 am-5 pm.  Pharmacy : Monday  -Thursday 8 am-8 pm; Friday 8 am-6 pm; Saturday and  9 am-5 pm.     Clinic: [phone]   Pharmacy: [phone]      Please call [facility] Radiology/[phone] to schedule your abdominal/pelvic CT.           Follow-ups after your visit        Future tests that were ordered for you today     Open Future Orders        Priority Expected Expires Ordered    CT Abdomen Pelvis w/o Contrast STAT 2018 2018 6/15/2018    XR Chest 2 Views Routine 6/15/2018 6/15/2019 6/15/2018            Who to contact     If you have questions or need follow up information about today's clinic visit or your schedule please contact [facility] directly at [phone].  Normal or non-critical lab and imaging results will be communicated to you by Attenexhart, letter or phone within 4 business days after the clinic has received the results. If you do not hear from us within 7 days, please contact the clinic through Attenexhart or phone. If you have a critical or abnormal lab result, we will notify you by phone as soon as possible.  Submit refill requests through NPS or call your pharmacy and they will forward the refill request to us. Please allow 3 business days for your refill to be completed.          Additional Information About Your Visit        MyChart Information     NPS gives you secure access to your electronic health record. If you see a primary care provider, you can also send messages to your care team and make appointments. If you have questions, please call your primary care clinic.  If you do not have a primary care provider, please call [phone] and they will assist you.        Care EveryWhere ID     This is your Care EveryWhere ID. This could be used by other organizations to access your [place] medical records  [id number]        Your Vitals Were     Pulse Temperature Height Pulse Oximetry BMI (Body Mass Index)       93 98.4  F (36.9  C) (Oral) 1.79 m (5'  "10.47\") 99% 22.5 kg/m2        Blood Pressure from Last 3 Encounters:   06/15/18 121/72   02/27/18 110/70   02/16/18 122/82    Weight from Last 3 Encounters:   06/15/18 72.1 kg (158 lb 14.4 oz)   02/27/18 70.9 kg (156 lb 3.2 oz)   02/16/18 69.9 kg (154 lb)              We Performed the Following     Alpha-1-antitrypsin total     Anti Nuclear Anabell IgG by IFA with Reflex     Blood Morphology Pathologist Review     CBC with platelets differential     Ceruloplasmin     Chlamydia trachomatis PCR     CRP inflammation     ESR: Erythrocyte sedimentation rate     F Actin EIA with reflex     Ferritin     Hepatitis B surface antigen     Hepatitis C Screen Reflex to HCV RNA Quant and Genotype     HIV Antigen Antibody Combo     Iron and iron binding capacity     Lyme Disease Anabell with reflex to WB Serum     Mitochondrial M2 Antibody IgG     Neisseria gonorrhoeae PCR     Reticulocyte Count     UA reflex to Microscopic and Culture     Urine Culture Aerobic Bacterial     Urine Microscopic        Primary Care Provider Office Phone # Fax #    Zainab Geraldine Luiz Fish [phone]       [address]  [facility] [address]        Equal Access to Services     LAURA MACIEL : Hadii aad ku hadasho Soomaali, waaxda luqadaha, qaybta kaalmada adeegyada, waxay gerin hayjonna hawley . So [facility] [phone].    ATENCIÓN: Si habla español, tiene a [place] disposición servicios gratuitos de asistencia lingüística. [facility] [phone].    We comply with applicable federal civil rights laws and [place] laws. We do not discriminate on the basis of race, color, national origin, age, disability, sex, sexual orientation, or gender identity.            Thank you!     Thank you for choosing [facility]  for your care. Our goal is always to provide you with excellent care. Hearing back from our patients is one way we can continue to improve our services. Please take a few minutes to complete the " written survey that you may receive in the mail after your visit with us. Thank you!             Your Updated Medication List - Protect others around you: Learn how to safely use, store and throw away your medicines at www.disposemymeds.org.      Notice  As of 6/15/2018  4:47 PM    You have not been prescribed any medications.

## 2018-06-15 NOTE — PATIENT INSTRUCTIONS
At [facility], we strive to deliver an exceptional experience to you, every time we see you.  If you receive a survey in the mail, please send us back your thoughts. We really do value your feedback.    Based on your medical history, these are the current health maintenance/preventive care services that you are due for (some may have been done at this visit.)  Health Maintenance Due   Topic Date Due     HIV SCREEN (SYSTEM ASSIGNED)  2003         Suggested websites for health information:  Www.[employer].org : Up to date and easily searchable information on multiple topics.  Www.medlineplus.gov : medication info, interactive tutorials, watch real surgeries online  Www.familydoctor.org : good info from the Academy of Family Physicians  Www.cdc.gov : public health info, travel advisories, epidemics (H1N1)  Www.aap.org : children's health info, normal development, vaccinations  Www.health.[facility].[place].us : [place] dept of health, public health issues in [place], N1N1    Your care team:                            Family Medicine Internal Medicine   MD Jose Manuel Patel MD Shantel Branch-Fleming, MD Katya Georgiev PA-C Nam Ho, MD Pediatrics   ADITYA Cruz, CNP Teagan CACERES CNP   MD Sanam Wen MD Deborah Mielke, MD Kim Thein, APRN CNP      Clinic hours: Monday - Thursday 7 am-7 pm;  7 am-5 pm.   Urgent care: Monday - Friday 11 am-9 pm; Saturday and  9 am-5 pm.  Pharmacy : Monday -Thursday 8 am-8 pm; Friday 8 am-6 pm; Saturday and  9 am-5 pm.     Clinic: [phone]   Pharmacy: [phone]      Please call [facility] Radiology/[phone] to schedule your abdominal/pelvic CT.

## 2018-06-16 PROBLEM — Z87.11 HISTORY OF GASTRIC ULCER: Status: ACTIVE | Noted: 2018-06-16

## 2018-06-16 LAB
BACTERIA SPEC CULT: NORMAL
BACTERIA SPEC CULT: NORMAL
SPECIMEN SOURCE: NORMAL

## 2018-06-17 LAB
C TRACH DNA SPEC QL NAA+PROBE: NEGATIVE
HCV AB SERPL QL IA: NONREACTIVE
N GONORRHOEA DNA SPEC QL NAA+PROBE: NEGATIVE
SMA IGG SER-ACNC: 6 UNITS (ref 0–19)
SPECIMEN SOURCE: NORMAL
SPECIMEN SOURCE: NORMAL

## 2018-06-18 ENCOUNTER — RADIANT APPOINTMENT (OUTPATIENT)
Dept: CT IMAGING | Facility: CLINIC | Age: 33
End: 2018-06-18
Attending: FAMILY MEDICINE
Payer: COMMERCIAL

## 2018-06-18 DIAGNOSIS — R59.0 POSTERIOR CERVICAL ADENOPATHY: ICD-10-CM

## 2018-06-18 DIAGNOSIS — R30.0 DYSURIA: ICD-10-CM

## 2018-06-18 DIAGNOSIS — R17 ELEVATED BILIRUBIN: ICD-10-CM

## 2018-06-18 DIAGNOSIS — R10.9 FLANK PAIN: ICD-10-CM

## 2018-06-18 DIAGNOSIS — R31.29 MICROHEMATURIA: ICD-10-CM

## 2018-06-18 LAB
A1AT SERPL-MCNC: 133 MG/DL (ref 80–200)
B BURGDOR IGG+IGM SER QL: 0.05 (ref 0–0.89)
CERULOPLASMIN SERPL-MCNC: 33 MG/DL (ref 23–53)
HBV SURFACE AG SERPL QL IA: NONREACTIVE
HIV 1+2 AB+HIV1 P24 AG SERPL QL IA: NONREACTIVE
MITOCHONDRIA M2 IGG SER-ACNC: 1 U/ML

## 2018-06-18 PROCEDURE — 74176 CT ABD & PELVIS W/O CONTRAST: CPT | Performed by: STUDENT IN AN ORGANIZED HEALTH CARE EDUCATION/TRAINING PROGRAM

## 2018-06-19 ENCOUNTER — OFFICE VISIT (OUTPATIENT)
Dept: FAMILY MEDICINE | Facility: CLINIC | Age: 33
End: 2018-06-19
Payer: COMMERCIAL

## 2018-06-19 VITALS
SYSTOLIC BLOOD PRESSURE: 120 MMHG | WEIGHT: 156 LBS | HEIGHT: 70 IN | BODY MASS INDEX: 22.33 KG/M2 | OXYGEN SATURATION: 100 % | DIASTOLIC BLOOD PRESSURE: 71 MMHG | TEMPERATURE: 98.6 F | HEART RATE: 83 BPM

## 2018-06-19 DIAGNOSIS — N42.81 PROSTADYNIA: Primary | ICD-10-CM

## 2018-06-19 LAB — ANA SER QL IF: NEGATIVE

## 2018-06-19 PROCEDURE — 99000 SPECIMEN HANDLING OFFICE-LAB: CPT | Performed by: FAMILY MEDICINE

## 2018-06-19 PROCEDURE — G0103 PSA SCREENING: HCPCS | Performed by: FAMILY MEDICINE

## 2018-06-19 PROCEDURE — 36415 COLL VENOUS BLD VENIPUNCTURE: CPT | Performed by: FAMILY MEDICINE

## 2018-06-19 PROCEDURE — 99213 OFFICE O/P EST LOW 20 MIN: CPT | Performed by: FAMILY MEDICINE

## 2018-06-19 PROCEDURE — 86622 BRUCELLA ANTIBODY: CPT | Mod: 90 | Performed by: FAMILY MEDICINE

## 2018-06-19 RX ORDER — DOXYCYCLINE HYCLATE 100 MG
100 TABLET ORAL 2 TIMES DAILY
Qty: 42 TABLET | Refills: 0 | Status: CANCELLED | OUTPATIENT
Start: 2018-06-19 | End: 2018-07-10

## 2018-06-19 RX ORDER — SULFAMETHOXAZOLE/TRIMETHOPRIM 800-160 MG
1 TABLET ORAL 2 TIMES DAILY
Qty: 42 TABLET | Refills: 0 | Status: SHIPPED | OUTPATIENT
Start: 2018-06-19 | End: 2018-07-10

## 2018-06-19 ASSESSMENT — PAIN SCALES - GENERAL: PAINLEVEL: MODERATE PAIN (4)

## 2018-06-19 NOTE — PROGRESS NOTES
"  SUBJECTIVE:   Keesha Norton is a 33 year old male who presents to clinic today for the following health issues:    Consult for abdominal CT done 6/18/18 due to complains of dysuria, back pain, abdominal pain, weakness and fatigue.  He reports the abdominal pain is improving since his last visit (6/15/18), but now he complains of increased pain in the prostate area and an increased cold sensation/numbness surrounding the genitals. The pain is improved with pressure to the area (SERG of prostate and/or pressure on the perineum).    Past medical, family, and social histories, medications, and allergies are reviewed and updated in Epic.     ROS:  CONSTITUTIONAL: NEGATIVE for fever, chills, change in weight  ENT/MOUTH: NEGATIVE for ear, mouth and throat problems  RESP: NEGATIVE for significant cough or SOB  CV: NEGATIVE for chest pain, palpitations or peripheral edema  ROS otherwise negative    This document serves as a record of the services and decisions personally performed and made by Dr. John. It was created on his behalf by Alisa Moore, a trained medical scribe. The creation of this document is based the provider's statements to the medical scribe.  Alisa Moore June 19, 2018 4:12 PM     OBJECTIVE:                                                    /71 (BP Location: Left arm, Patient Position: Chair, Cuff Size: Adult Regular)  Pulse 83  Temp 98.6  F (37  C) (Oral)  Ht 1.79 m (5' 10.47\")  Wt 70.8 kg (156 lb)  SpO2 100%  BMI 22.08 kg/m2   Body mass index is 22.08 kg/(m^2).     GENERAL: healthy, alert and no distress  EYES: Eyes grossly normal to inspection, PERRL, EOMI, sclerae white and conjunctivae normal  MS: no gross musculoskeletal defects noted, no edema  SKIN: no suspicious lesions or rashes  NEURO: Normal strength and tone, sensory exam grossly normal, mentation intact, oriented times 3 and cranial nerves 2-12 intact  PSYCH: mentation appears normal, affect normal/bright     Diagnostic Test " Results:  Results for orders placed or performed in visit on 06/18/18   CT Abdomen Pelvis w/o Contrast    Narrative    Examination:  CT ABDOMEN PELVIS W/O CONTRAST 6/18/2018 1:48 PM     History: Dysuria, microhematuria, flank pain    Comparison: Abdominal ultrasound 2/21/2017    Technique: CT of the abdomen and pelvis were obtained without contrast  per renal stone protocol. Sagittal and coronal reconstructions created  and reviewed.    Findings:   The liver, spleen, right adrenal gland, and pancreas are unremarkable  on this noncontrast exam.  Nonspecific left adrenal gland thickening.   The gallbladder is unremarkable. No renal calculi. No hydronephrosis.  Urinary bladder is unremarkable.    The small and large bowel are normal in caliber. The appendix is  visualized and unremarkable. No abdominal free fluid or free air.  Scattered retroperitoneal and mesenteric lymph nodes which are not  pathologically enlarged. No abdominal aortic aneurysm.    No acute osseous abnormalities. The lung bases are clear.       Impression    Impression: No hydronephrosis or renal calculi. No finding to explain  dysuria or hematuria.  CT urogram could be considered to further  evaluate microhematuria as indicated.    I have personally reviewed the examination and initial interpretation  and I agree with the findings.    WILIAM KEY MD        ASSESSMENT/PLAN:                                                      (N42.81) Prostadynia  (primary encounter diagnosis)  Comment: his pain has migrated to the prostate area, suggestive of prostatitis, although his CRP was normal. I think we can go ahead and treat for acute prostatitis. 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 He was last there 1.5 months ago.  Plan: Prostate spec antigen screen, Brucella species         antibody,  sulfamethoxazole-trimethoprim         (BACTRIM DS/SEPTRA DS) 800-160 MG per tablet,         UROLOGY ADULT REFERRAL        Follow up with urology if symptoms are not resolved when the antibiotics are done.      The information in this document, created by the medical scribe for me, accurately reflects the services I personally performed and the decisions made by me. I have reviewed and approved this document for accuracy prior to leaving the patient care area. June 19, 2018 4:12 PM     Lalo John MD

## 2018-06-19 NOTE — MR AVS SNAPSHOT
After Visit Summary   6/19/2018    Keesha Norton    MRN: [id number]           Patient Information     Date Of Birth          1985        Visit Information        Provider Department      6/19/2018 4:20 PM Lalo John MD [facility]        Today's Diagnoses     Prostadynia    -  1      Care Instructions    At [facility], we strive to deliver an exceptional experience to you, every time we see you.  If you receive a survey in the mail, please send us back your thoughts. We really do value your feedback.    Based on your medical history, these are the current health maintenance/preventive care services that you are due for (some may have been done at this visit.)  There are no preventive care reminders to display for this patient.    Suggested websites for health information:  Www.Seniorlink.[employer]andel[employer] : Up to date and easily searchable information on multiple topics.  Www.[employer].gov : medication info, interactive tutorials, watch real surgeries online  Www.familydoctor.org : good info from the Academy of Family Physicians  Www.cdc.gov : public health info, travel advisories, epidemics (H1N1)  Www.aap.org : children's health info, normal development, vaccinations  Www.health.state[place] : [place] dept of health, public health issues in [place], N1N1    Your care team:                            Family Medicine Internal Medicine   MD Jose Manuel Patel MD Shantel Branch-Fleming, MD Katya Georgiev PA-C Megan Hill, MORRIS Samson MD Pediatrics   ADITYA Cruz, MD Teagan Alex APRN CNP   MD Sanam Wen MD Deborah Mielke, MD Kim Thein, APRN [facility]      Clinic hours: Monday - Thursday 7 am-7 pm; Fridays 7 am-5 pm.   Urgent care: Monday - Friday 11 am-9 pm; Saturday and Sunday 9 am-5 pm.  Pharmacy : Monday -Thursday 8 am-8 pm; Friday 8 am-6 pm; Saturday and Sunday 9 am-5 pm.     Clinic:  [phone]   Pharmacy: [phone]                Follow-ups after your visit        Additional Services     UROLOGY ADULT REFERRAL       Your provider has referred you to:   FMG: [place] [facility] [place] [phone]   [email or web address]  FM[facility]: [place] [facility] [place] [phone]   [email or web address]  FM[facility]: [facility] [place] [phone]   [email or web address]  UMP: [facility] Department of Urology - [place] [phone]   [email or web address]  UMP: [facility] [place] [phone]   [email or web address]    Please be aware that coverage of these services is subject to the terms and limitations of your health insurance plan.  Call member services at your health plan with any benefit or coverage questions.      Please bring the following with you to your appointment:    (1) Any X-Rays, CTs or MRIs which have been performed.  Contact the facility where they were done to arrange for  prior to your scheduled appointment.    (2) List of current medications  (3) This referral request   (4) Any documents/labs given to you for this referral                  Who to contact     If you have questions or need follow up information about today's clinic visit or your schedule please contact [facility] directly at [phone].  Normal or non-critical lab and imaging results will be communicated to you by MyChart, letter or phone within 4 business days after the clinic has received the results. If you do not hear from us within 7 days, please contact the clinic through MyChart or phone. If you have a critical or abnormal lab result, we will notify you by phone as soon as possible.  Submit refill requests through [employer] or call  "your pharmacy and they will forward the refill request to us. Please allow 3 business days for your refill to be completed.          Additional Information About Your Visit        [employer] Information     [employer] gives you secure access to your electronic health record. If you see a primary care provider, you can also send messages to your care team and make appointments. If you have questions, please call your primary care clinic.  If you do not have a primary care provider, please call [phone] and they will assist you.        Care EveryWhere ID     This is your Care EveryWhere ID. This could be used by other organizations to access your [place] medical records  [id number]        Your Vitals Were     Pulse Temperature Height Pulse Oximetry BMI (Body Mass Index)       83 98.6  F (37  C) (Oral) 1.79 m (5' 10.47\") 100% 22.08 kg/m2        Blood Pressure from Last 3 Encounters:   06/19/18 120/71   06/15/18 121/72   02/27/18 110/70    Weight from Last 3 Encounters:   06/19/18 70.8 kg (156 lb)   06/15/18 72.1 kg (158 lb 14.4 oz)   02/27/18 70.9 kg (156 lb 3.2 oz)              We Performed the Following     Brucella species antibody     Prostate spec antigen screen     UROLOGY ADULT REFERRAL          Today's Medication Changes          These changes are accurate as of 6/19/18  4:47 PM.  If you have any questions, ask your nurse or doctor.               Start taking these medicines.        Dose/Directions    sulfamethoxazole-trimethoprim 800-160 MG per tablet   Commonly known as:  BACTRIM DS/SEPTRA DS   Used for:  Prostadynia   Started by:  Lalo John MD        Dose:  1 tablet   Take 1 tablet by mouth 2 times daily for 21 days   Quantity:  42 tablet   Refills:  0            Where to get your medicines      These [place] [facility] [place] [address]  [address], [place] [address]     Phone:  [phone]     sulfamethoxazole-trimethoprim " 800-160 MG per tablet                Primary Care Provider Office Phone # Fax #    Zainab Lrbalaji Fish [phone]       [address]  [facility] [id number]        Equal Access to Services     MARIA ELENA MACIEL : Hadii aad ku hadasho Soomaali, waaxda luqadaha, qaybta kaalmada adeegyada, waxay idiin hayaan adeeg kharash lalouie . So [facility] [phone].    ATENCIÓN: Si habla español, tiene a [place] disposición servicios gratuitos de asistencia lingüística. Llame al [phone].    We comply with applicable federal civil rights laws and [place] laws. We do not discriminate on the basis of race, color, national origin, age, disability, sex, sexual orientation, or gender identity.            Thank you!     Thank you for choosing [facility]  for your care. Our goal is always to provide you with excellent care. Hearing back from our patients is one way we can continue to improve our services. Please take a few minutes to complete the written survey that you may receive in the mail after your visit with us. Thank you!             Your Updated Medication List - Protect others around you: Learn how to safely use, store and throw away your medicines at www.disposemymeds.org.          This list is accurate as of 6/19/18  4:47 PM.  Always use your most recent med list.                   Brand Name Dispense Instructions for use Diagnosis    sulfamethoxazole-trimethoprim 800-160 MG per tablet    BACTRIM DS/SEPTRA DS    42 tablet    Take 1 tablet by mouth 2 times daily for 21 days    Prostadynia

## 2018-06-19 NOTE — PATIENT INSTRUCTIONS
At [facility], we strive to deliver an exceptional experience to you, every time we see you.  If you receive a survey in the mail, please send us back your thoughts. We really do value your feedback.    Based on your medical history, these are the current health maintenance/preventive care services that you are due for (some may have been done at this visit.)  There are no preventive care reminders to display for this patient.    Suggested websites for health information:  Www.[facility].org : Up to date and easily searchable information on multiple topics.  Www.medlineplus.gov : medication info, interactive tutorials, watch real surgeries online  Www.familydoctor.org : good info from the Academy of Family Physicians  Www.cdc.gov : public health info, travel advisories, epidemics (H1N1)  Www.aap.org : children's health info, normal development, vaccinations  Www.health.[facility].[place].us : [place] dept of health, public health issues in [place], N1N1    Your care team:                            Family Medicine Internal Medicine   MD Jose Manuel Patel MD Shantel Branch-Fleming, MD Katya Georgiev PA-C Megan Hill, APRN CNP    Dion Samson MD Pediatrics   Lesetr Rios, PAOlga LidiaC  Marcelle Marie, CNP MD Teagan Luna APRN CNP   MD Sanam Wen MD Deborah Mielke, MD Kim Thein, APRN CNP      Clinic hours: Monday - Thursday 7 am-7 pm; Fridays 7 am-5 pm.   Urgent care: Monday - Friday 11 am-9 pm; Saturday and Sunday 9 am-5 pm.  Pharmacy : Monday -Thursday 8 am-8 pm; Friday 8 am-6 pm; Saturday and Sunday 9 am-5 pm.     Clinic: [phone]   Pharmacy: [phone]

## 2018-06-20 LAB — PSA SERPL-ACNC: 0.53 UG/L (ref 0–4)

## 2018-06-21 LAB — COPATH REPORT: NORMAL

## 2018-06-22 LAB
BASOPHILS # BLD AUTO: 0.1 10E9/L (ref 0–0.2)
BASOPHILS NFR BLD AUTO: 1 %
DIFFERENTIAL METHOD BLD: NORMAL
EOSINOPHIL # BLD AUTO: 0.3 10E9/L (ref 0–0.7)
EOSINOPHIL NFR BLD AUTO: 4 %
ERYTHROCYTE [DISTWIDTH] IN BLOOD BY AUTOMATED COUNT: 12.3 % (ref 10–15)
HCT VFR BLD AUTO: 46.8 % (ref 40–53)
HGB BLD-MCNC: 16.2 G/DL (ref 13.3–17.7)
LYMPHOCYTES # BLD AUTO: 1.9 10E9/L (ref 0.8–5.3)
LYMPHOCYTES NFR BLD AUTO: 27 %
MCH RBC QN AUTO: 30.4 PG (ref 26.5–33)
MCHC RBC AUTO-ENTMCNC: 34.6 G/DL (ref 31.5–36.5)
MCV RBC AUTO: 88 FL (ref 78–100)
MONOCYTES # BLD AUTO: 0.5 10E9/L (ref 0–1.3)
MONOCYTES NFR BLD AUTO: 7 %
NEUTROPHILS # BLD AUTO: 4.1 10E9/L (ref 1.6–8.3)
NEUTROPHILS NFR BLD AUTO: 61 %
PLATELET # BLD AUTO: 201 10E9/L (ref 150–450)
PLATELET # BLD EST: NORMAL 10*3/UL
RBC # BLD AUTO: 5.33 10E12/L (ref 4.4–5.9)
RBC MORPH BLD: NORMAL
WBC # BLD AUTO: 6.9 10E9/L (ref 4–11)

## 2018-06-23 LAB — BRUCELLA AB TITR SER AGGL: NORMAL {TITER}

## 2018-06-30 ENCOUNTER — MYC MEDICAL ADVICE (OUTPATIENT)
Dept: FAMILY MEDICINE | Facility: CLINIC | Age: 33
End: 2018-06-30

## 2018-07-02 NOTE — TELEPHONE ENCOUNTER
MyChart message sent to patient advising to be seen for evaluation of symptoms and to further discuss any concerns regarding the meningitis vaccine he received when was younger and being told was not effective. Advised if having more severe symptoms-high fevers, headaches, n/v, joint pain, photophobia, dizziness or confusion, increased drowsiness, to call clinic and speak with nurse to advise on best course of action.    Ade Chavez RN  [facility] Triage

## 2018-07-10 ENCOUNTER — OFFICE VISIT (OUTPATIENT)
Dept: FAMILY MEDICINE | Facility: CLINIC | Age: 33
End: 2018-07-10
Payer: COMMERCIAL

## 2018-07-10 VITALS
HEIGHT: 70 IN | DIASTOLIC BLOOD PRESSURE: 73 MMHG | HEART RATE: 77 BPM | OXYGEN SATURATION: 99 % | WEIGHT: 159 LBS | BODY MASS INDEX: 22.76 KG/M2 | TEMPERATURE: 98.6 F | SYSTOLIC BLOOD PRESSURE: 127 MMHG

## 2018-07-10 DIAGNOSIS — R09.81 SINUS CONGESTION: ICD-10-CM

## 2018-07-10 DIAGNOSIS — R42 VERTIGO: Primary | ICD-10-CM

## 2018-07-10 DIAGNOSIS — H93.12 TINNITUS, LEFT: ICD-10-CM

## 2018-07-10 DIAGNOSIS — Z23 NEED FOR MENINGITIS VACCINATION: ICD-10-CM

## 2018-07-10 PROCEDURE — 90734 MENACWYD/MENACWYCRM VACC IM: CPT | Performed by: FAMILY MEDICINE

## 2018-07-10 PROCEDURE — 99214 OFFICE O/P EST MOD 30 MIN: CPT | Mod: 25 | Performed by: FAMILY MEDICINE

## 2018-07-10 PROCEDURE — 90471 IMMUNIZATION ADMIN: CPT | Performed by: FAMILY MEDICINE

## 2018-07-10 RX ORDER — MECLIZINE HYDROCHLORIDE 25 MG/1
25 TABLET ORAL 3 TIMES DAILY PRN
Qty: 60 TABLET | Refills: 0 | Status: SHIPPED | OUTPATIENT
Start: 2018-07-10 | End: 2023-04-07

## 2018-07-10 ASSESSMENT — PAIN SCALES - GENERAL: PAINLEVEL: NO PAIN (0)

## 2018-07-10 NOTE — NURSING NOTE
Screening Questionnaire for Adult Immunization    Are you sick today?   No   Do you have allergies to medications, food, a vaccine component or latex?   No   Have you ever had a serious reaction after receiving a vaccination?   No   Do you have a long-term health problem with heart disease, lung disease, asthma, kidney disease, metabolic disease (e.g. diabetes), anemia, or other blood disorder?   No   Do you have cancer, leukemia, HIV/AIDS, or any other immune system problem?   No   In the past 3 months, have you taken medications that affect  your immune system, such as prednisone, other steroids, or anticancer drugs; drugs for the treatment of rheumatoid arthritis, Crohn s disease, or psoriasis; or have you had radiation treatments?   No   Have you had a seizure, or a brain or other nervous system problem?   No   During the past year, have you received a transfusion of blood or blood     products, or been given immune (gamma) globulin or antiviral drug?   No   For women: Are you pregnant or is there a chance you could become        pregnant during the next month?   No   Have you received any vaccinations in the past 4 weeks?   No     Immunization questionnaire answers were all negative.         Patient instructed to remain in clinic for 15 minutes afterwards, and to report any adverse reaction to me immediately.       Screening performed by Deepthi Collins on 7/10/2018 at 3:54 PM.

## 2018-07-10 NOTE — MR AVS SNAPSHOT
After Visit Summary   7/10/2018    Keesha Norton    MRN: [id number]           Patient Information     Date Of Birth          1985        Visit Information        Provider Department      7/10/2018 3:20 PM Lalo John MD [facility]        Today's Diagnoses     Vertigo    -  1    Tinnitus, left        Sinus congestion        Need for meningitis vaccination          Care Instructions    At [facility], we strive to deliver an exceptional experience to you, every time we see you.  If you receive a survey in the mail, please send us back your thoughts. We really do value your feedback.    Based on your medical history, these are the current health maintenance/preventive care services that you are due for (some may have been done at this visit.)  There are no preventive care reminders to display for this patient.    Suggested websites for health information:  Www.Immediately.Tendril : Up to date and easily searchable information on multiple topics.  Www.Audingo.gov : medication info, interactive tutorials, watch real surgeries online  Www.familydoctor.org : good info from the Academy of Family Physicians  Www.cdc.gov : public health info, travel advisories, epidemics (H1N1)  Www.aap.org : children's health info, normal development, vaccinations  Www.health.[facility].[place].us : [place] dept of health, public health issues in [place], N1N1    Your care team:                            Family Medicine Internal Medicine   MD Jose Manuel Patel MD Shantel Branch-Fleming, MD Katya Georgiev PA-C Megan Hill, APRN BEBETO Samson MD Pediatrics   ADITYA Cruz, MD Teagan Alex APRN CNP   MD Sanam Wen MD Deborah Mielke, MD Kim Thein, APRN CNP      Clinic hours: Monday - Thursday 7 am-7 pm; Fridays 7 am-5 pm.   Urgent care: Monday - Friday 11 am-9 pm; Saturday and Sunday 9 am-5 pm.  Pharmacy : Monday  -Thursday 8 am-8 pm; Friday 8 am-6 pm; Saturday and Sunday 9 am-5 pm.     Clinic: [phone]   Pharmacy: [phone]        Vertigo (Unknown Cause)    In addition to helping with hearing, the inner ear is part of the balance center of your body. Problems with the inner ear can a false feeling of motion. This is called vertigo. Often, it feels as if you or the room is spinning. A vertigo attack may cause sudden nausea, vomiting and heavy sweating. Severe vertigo causes a loss of balance and can cause you to fall. During vertigo, small head movements and changes in body position will often make the symptoms worse. You may also have ringing in the ears called tinnitus.  An episode of vertigo may last seconds, minutes or hours. Once you are over the first episode, it may never come back. However, symptoms may return off and on.  The cause of your vertigo is not yet known. Possible causes of vertigo include:    Inflammation of the inner ear    Disease of the nerves to the inner ear    Movement of calcium particles in the inner ear    Poor blood flow to the balance centers of the brain    Migraine headaches    In older adults, the use of more than one medicine along with some health conditions  Home care    If symptoms are severe, rest quietly in bed. Change positions very slowly. There is usually one position that will feel best, such as lying on one side or lying on your back with your head slightly raised on pillows. Until you have no symptoms, you are at a higher risk of falling. Let someone help you when you get up. Get rid of home hazards such as loose electrical cords and throw rugs. Don t walk in unfamiliar areas that are not lighted. Use night lights in bathrooms and kitchen areas.    Do not drive a car or work with dangerous machinery until symptoms have been gone for at least one week.    Take medicine as prescribed to relieve your symptoms. Unless another medicine was prescribed for symptoms of nausea,  vomiting, and dizziness, you may use over-the-counter motion sickness pills. Ask your pharmacist for suggestions.  Follow-up care  Follow up with your healthcare provider or as directed. If you are referred to a specialist or for testing, make the appointment promptly.  When to seek medical advice  Call your healthcare provider if any of the following occur:    Fever of 100.4 F (38 C) or higher, or as directed by your healthcare provider    Vertigo worsens or is not controlled by prescribed medicine     Repeated vomiting not relieved by prescribed medicine     Severe headache    Confusion    Weakness of an arm or leg or 1 side of the face    Difficulty with speech or vision    Loss of consciousness     Seizure  Date Last Reviewed: 11/1/2017 2000-2017 [employer]. [address] [place] [address], [place] [address]. All rights reserved. This information is not intended as a substitute for professional medical care. Always follow your healthcare professional's instructions.        Tinnitus (Ringing in the Ears)     Treatment may include maskers and hearing aids.     Tinnitus is the term for a noise in your ear not caused by an outside sound. The noise might be a ringing, clicking, hiss, or roar. It can vary in pitch and may be soft or quite loud. For some people, tinnitus is a minor nuisance. But for others, the noise can make it hard to hear, work, and even sleep. When tinnitus can't be cured, a number of treatments may offer relief.  What causes tinnitus?  Loud noises, hearing loss, and ear wax can cause tinnitus. So can certain medicines. Large amounts of aspirin or caffeine are sometimes to blame. In many cases, the exact cause of tinnitus is unknown.  How is tinnitus treated?  Identifying and removing the cause is the best way to treat tinnitus. For that reason, your healthcare provider may refer you to an otolaryngologist (ear, nose, and throat doctor). Your hearing may also be checked by an audiologist  (hearing specialist). If you have hearing loss, wearing a hearing aid may help your tinnitus. When the cause can't be found, the tinnitus itself may be treated. Some of the treatments are listed below, and your healthcare provider can tell you more about them:    Maskers are small devices that look like hearing aids. They emit a pleasant sound that helps cover up the ringing in your ears. Hearing aids and maskers are sometimes used together.    Medicines that treat anxiety and depression may ease tinnitus in some people.    Hypnosis or relaxation therapy may help head noise seem less severe.    Tinnitus retraining therapy combines counseling and maskers. Both can help take your mind off the tinnitus.  For more information    American Speech-Hearing-Language Association [phone] www.nini.org    American Tinnitus Association [phone] www.tayo.org    National [place] on Deafness and other Communication Disorders [phone] www.nidcd.nih.gov   Date Last Reviewed: 7/1/2016 2000-2017 [employer].. [address] [place] [address]. All rights reserved. This information is not intended as a substitute for professional medical care. Always follow your healthcare professional's instructions.        What Is Meniere s Disease?    Meniere s disease is a problem with the inner ear. This part of the ear is responsible for balance as well as hearing. This sheet will tell you more about the disease and its symptoms.  Meniere s disease affects the inner ear  With Meniere s, too much endolymph (fluid) backs up in the canals of the inner ear. This is a condition called endolymphatic hydrops. Extra fluid causes pressure to build up. The canals then swell and can t work right. Swelling in the hearing canal distorts or blocks sound information. Swelling in the balance canals distorts balance information. Distorted information travels from the inner ear to the brain. This causes problems with both  balance and hearing. Although Meniere s usually affects only one ear, it can occur in both ears.  Symptoms of Meniere s disease    Vertigo, a spinning or whirling sensation that causes balance problems. During an attack of vertigo, you may have nausea, vomiting, and sweating. Attacks usually begin suddenly. They may last for 20 minutes to several hours. You may have attacks rarely, frequently, or in clusters. The first attack is usually the most intense.    Problems with hearing. Hearing is often partially or completely lost in the affected ear during vertigo attacks. It s common for hearing to gradually get worse as the illness goes on.    Tinnitus, ringing, buzzing, whistling, or roaring noises in the affected ear. These noises may come and go or may always be present. The noises may get louder just before a vertigo attack.    A feeling of pressure or fullness in the ear. This is sometimes felt most strongly right before a vertigo attack.  You may feel fine between attacks. Or hearing or balance problems may continue between attacks.  Date Last Reviewed: 10/1/2016    3761-7890 [employer]. [address] [place] [address]. All rights reserved. This information is not intended as a substitute for professional medical care. Always follow your healthcare professional's instructions.        Meniere s Disease    Meniere s disease is a chronic recurring condition. It is due to a problem with the inner ear. This is the part of the ear responsible for balance as well as hearing. Symptoms include:    Sudden attacks of vertigo. Vertigo is a spinning or whirling feeling that causes balance problems. These attacks often include nausea, vomiting, and sweating. During an attack of vertigo, head movement and body position changes will worsen symptoms.    Hearing problems. Hearing is often partly or completely lost during the vertigo attack, then comes back. Over time, though, hearing can be  affected.    Tinnitus. This is ringing, buzzing, whistling, or roaring noises in the ear. It may come and go or always be present.     Feeling of pressure. You may also have a feeling of pressure or fullness in the ear.  Attacks may occur weeks, months, or even years apart. Each episode of vertigo may last 20 minutes to several hours. The symptoms are due to changes in fluid in the inner ear canals. The exact cause is not known.   Treatment for Meniere s disease includes lifestyle changes, medicines, and medical procedures. Surgery may be advised in severe cases.  Home care  Medicines  You may be prescribed medicine to take regularly to help prevent attacks. You may also be prescribed medicine to take only during attacks. Take these as directed.  Lifestyle changes  These lifestyle changes can help make attacks less frequent or less severe.     Reduce your salt intake. Eating too much salt can make this condition worse. A common recommendation is to limit sodium to no more than 2,300 mg a day. Talk to your healthcare provider about ways to limit sodium in your diet.    Quit smoking.    Limit alcohol and caffeine.    Try to limit stress.  During Attacks  If an attack is about to start or has started, take any medicine you have been prescribed for an attack. Lie down on a firm surface in a darkened room. Stay as still as possible. Stay away from bright light. Do not try to read or watch TV. Don't get up until the spinning passes.  Follow-up care  Follow up with your healthcare provider for further assessment and treatment. If you have been referred to a specialist, make an appointment right away.  When to seek medical advice  Call your healthcare provider right away if you have:    Symptoms that get worse    Weakness that gets worse    Fainting    Headache or unusual drowsiness    Trouble with speech or movement    Fever of 100.4 F (38 C) or higher, or as directed by your healthcare provider  Date Last Reviewed:  10/1/2017    1059-3762 [employer]. [address] [place] [address]. All rights reserved. This information is not intended as a substitute for professional medical care. Always follow your healthcare professional's instructions.                Follow-ups after your visit        Additional Services     AUDIOLOGY ADULT REFERRAL       Your provider has referred you to:   FM[facility]: [facility] [phone]   [email or web address]  [facility]: [facility] [phone]   [email or web address]/  FM[facility]: [facility] [phone]   [email or web address]  FM[facility]: [facility] [phone]   [email or web address]    Specialty Testing:  Audiogram w/Tymps and Reflexes (Comprehensive Audiology Evaluation)            OTOLARYNGOLOGY REFERRAL       Your provider has referred you to:     [facility] [phone]   [email or web address]/  [facility] [phone]   [email or web address]/  [facility] [phone]   [email or web address]/  [facility] [phone]   [email or web address]    Please be aware that coverage of these services is subject to the terms and limitations of your health insurance plan.  Call member services at your health plan with any benefit or coverage questions.      Please bring the following with you to your appointment:    (1) Any X-Rays, CTs or MRIs which have been performed.  Contact the facility where they were done to arrange for  prior to your scheduled appointment.   (2) List of current medications  (3) This referral request   (4) Any documents/labs given to you for this referral                  Who to contact   "   If you have questions or need follow up information about today's clinic visit or your schedule please contact [facility] DANE SOTOMAYOR directly at [phone].  Normal or non-critical lab and imaging results will be communicated to you by MyChart, letter or phone within 4 business days after the clinic has received the results. If you do not hear from us within 7 days, please contact the clinic through [employer] [facility] or phone. If you have a critical or abnormal lab result, we will notify you by phone as soon as possible.  Submit refill requests through [employer] or call your pharmacy and they will forward the refill request to us. Please allow 3 business days for your refill to be completed.          Additional Information About Your Visit        [employer] Information     [employer] gives you secure access to your electronic health record. If you see a primary care provider, you can also send messages to your care team and make appointments. If you have questions, please call your primary care clinic.  If you do not have a primary care provider, please call [phone] and they will assist you.        Care EveryWhere ID     This is your Care EveryWhere ID. This could be used by other organizations to access your [place] medical records  [id number]        Your Vitals Were     Pulse Temperature Height Pulse Oximetry BMI (Body Mass Index)       77 98.6  F (37  C) (Oral) 5' 10.47\" (1.79 m) 99% 22.51 kg/m2        Blood Pressure from Last 3 Encounters:   07/10/18 127/73   06/19/18 120/71   06/15/18 121/72    Weight from Last 3 Encounters:   07/10/18 159 lb (72.1 kg)   06/19/18 156 lb (70.8 kg)   06/15/18 158 lb 14.4 oz (72.1 kg)              We Performed the Following     AUDIOLOGY ADULT REFERRAL     MENINGOCOCCAL VACCINE,IM (MENACTRA)     OTOLARYNGOLOGY REFERRAL          Today's Medication Changes          These changes are accurate as of 7/10/18  3:41 PM.  If you have any questions, ask your nurse or doctor.          "      Start taking these medicines.        Dose/Directions    amoxicillin-clavulanate 875-125 MG per tablet   Commonly known as:  AUGMENTIN   Used for:  Sinus congestion   Started by:  Lalo John MD        Dose:  1 tablet   Take 1 tablet by mouth 2 times daily for 10 days for possible sinus infection.   Quantity:  20 tablet   Refills:  0       meclizine 25 MG tablet   Commonly known as:  ANTIVERT   Used for:  Vertigo   Started by:  Lalo John MD        Dose:  25 mg   Take 1 tablet (25 mg) by mouth 3 times daily as needed for dizziness   Quantity:  60 tablet   Refills:  0            Where to get your medicines      [place] [facility] [place] [address]  [address], [facility] [address]     Phone:  [phone]     amoxicillin-clavulanate 875-125 MG per tablet    meclizine 25 MG tablet                Primary Care Provider Office Phone # Fax #    Zainab Lrbalaji Fish [phone]       [address]  [facility] [phone]        Equal Access to Services     [facility]: Hadii aad ku hadasho Soomaali, waaxda luqadaha, qaybta kaalmada adeegyada, jeffery ramirez hayjonna hawley . So [facility] [phone].    ATENCIÓN: Si habla español, tiene a [place] disposición servicios gratuitos de asistencia lingüística. [facility] [phone].    We comply with applicable federal civil rights laws and [place] laws. We do not discriminate on the basis of race, color, national origin, age, disability, sex, sexual orientation, or gender identity.            Thank you!     Thank you for choosing [facility]  for your care. Our goal is always to provide you with excellent care. Hearing back from our patients is one way we can continue to improve our services. Please take a few minutes to complete the written survey that you may receive in the mail after your visit with us. Thank you!              Your Updated Medication List - Protect others around you: Learn how to safely use, store and throw away your medicines at www.disposemymeds.org.          This list is accurate as of 7/10/18  3:41 PM.  Always use your most recent med list.                   Brand Name Dispense Instructions for use Diagnosis    amoxicillin-clavulanate 875-125 MG per tablet    AUGMENTIN    20 tablet    Take 1 tablet by mouth 2 times daily for 10 days for possible sinus infection.    Sinus congestion       meclizine 25 MG tablet    ANTIVERT    60 tablet    Take 1 tablet (25 mg) by mouth 3 times daily as needed for dizziness    Vertigo       sulfamethoxazole-trimethoprim 800-160 MG per tablet    BACTRIM DS/SEPTRA DS    42 tablet    Take 1 tablet by mouth 2 times daily for 21 days    Prostadynia

## 2018-07-10 NOTE — PROGRESS NOTES
SUBJECTIVE:   Keesha Norton is a 33 year old male who presents to clinic today for the following health issues:    Dizziness  Onset: off and on since February, see ER visit 2/12/18 and office visit 2/27/18. Worse for about 3 weeks    Description:   Do you feel faint:  no   Does it feel like the surroundings (bed, room) are moving: YES  Unsteady/off balance: YES  Have you passed out or fallen: no     Intensity: moderate    Progression of Symptoms: worsening. Previously had intermittent dizziness and tinnitus over the last few months, but now has had dizzy symptoms multiple times over the last 3 weeks. The tinnitus has mostly resolved.    Accompanying Signs & Symptoms:  Heart palpitations: no   Nausea, vomiting: no   Weakness in arms or legs: no   Fatigue: YES  Vision or speech changes: no   Ringing in ears (Tinnitus): YES - bilateral, left>right  Hearing Loss: no   Neck and shoulder pain: YES - has been seeing a chiropractor which has been helpful  Sinus symptoms: YES - sinus congestion and post nasal drip. Thought it was allergy related so tried flonase with no relief.    History:   Head trauma/concussion hx: no   Previous similar symptoms: YES  Recent bleeding history: no     Precipitating factors:   Worse with activity or head movement: YES  Any new medications (BP?): no   Alcohol/drug abuse/withdrawal: no     Alleviating factors:   Does staying in a fixed position give relief:  YES    Therapies Tried and outcome: none    Medications updated and reviewed.  Past, family and surgical history is updated and reviewed in the record.    ROS:  Other than noted above, general, HEENT, respiratory, cardiac and gastrointestinal systems are negative.    This document serves as a record of the services and decisions personally performed and made by Dr. John. It was created on his behalf by Alisa Moore, a trained medical scribe. The creation of this document is based the provider's statements to the medical scribe.  Alisa  "Teresa July 10, 2018 3:28 PM     OBJECTIVE:                                                    /73 (BP Location: Left arm, Patient Position: Chair, Cuff Size: Adult Regular)  Pulse 77  Temp 98.6  F (37  C) (Oral)  Ht 1.79 m (5' 10.47\")  Wt 72.1 kg (159 lb)  SpO2 99%  BMI 22.51 kg/m2   Body mass index is 22.51 kg/(m^2).     GENERAL APPEARANCE ADULT: Alert, no acute distress  EYES: PERRL, EOM normal, conjunctiva and lids normal  HENT: right TM normal, left TM normal, nose no nasal discharge or congestion, frontal sinus tenderness no, maxillary sinus tenderness no, throat/mouth:normal, mucous membranes moist  RESP: lungs clear to auscultation   CV: normal rate, regular rhythm, no murmur or gallop  MS: extremities normal, no peripheral edema  SKIN: no suspicious lesions or rashes  NEURO: Alert, oriented, speech and mentation normal, Cranial nerves 2-12 are normal.  PSYCH: mentation appears normal., affect and mood normal    Diagnostic Test Results:  none      ASSESSMENT/PLAN:                                                      (R42) Vertigo  (primary encounter diagnosis)  Comment:   Plan: meclizine (ANTIVERT) 25 MG tablet, AUDIOLOGY         ADULT REFERRAL, OTOLARYNGOLOGY REFERRAL        Handout provided.    (H93.12) Tinnitus, left  Comment: rule out underlying hearing loss  Plan: AUDIOLOGY ADULT REFERRAL, OTOLARYNGOLOGY         REFERRAL        Handouts on Ménière's disease provided.     (R09.81) Sinus congestion  Comment: rule out sinusitis, but underlying allergies could be contributing to many problems  Plan: amoxicillin-clavulanate (AUGMENTIN) 875-125 MG         per tablet, OTOLARYNGOLOGY REFERRAL        Follow up as needed.     (Z23) Need for meningitis vaccination  Comment: meningococcal vaccine requested and given.   Plan: MENINGOCOCCAL VACCINE,IM (MENACTRA)            The information in this document, created by the medical scribe for me, accurately reflects the services I personally performed and " the decisions made by me. I have reviewed and approved this document for accuracy prior to leaving the patient care area. July 10, 2018 3:28 PM     Lalo John MD

## 2018-07-10 NOTE — PATIENT INSTRUCTIONS
At [facility], we strive to deliver an exceptional experience to you, every time we see you.  If you receive a survey in the mail, please send us back your thoughts. We really do value your feedback.    Based on your medical history, these are the current health maintenance/preventive care services that you are due for (some may have been done at this visit.)  There are no preventive care reminders to display for this patient.    Suggested websites for health information:  Www.[facility].org : Up to date and easily searchable information on multiple topics.  Www.medlineplus.gov : medication info, interactive tutorials, watch real surgeries online  Www.familydoctor.org : good info from the Academy of Family Physicians  Www.cdc.gov : public health info, travel advisories, epidemics (H1N1)  Www.aap.org : children's health info, normal development, vaccinations  Www.health.[facility].[place].us : [place] dept of health, public health issues in [place], N1N1    Your care team:                            Family Medicine Internal Medicine   MD Jose Manuel Patel MD Shantel Branch-Fleming, MD Katya Georgiev PA-C Megan Hill, APRN CNP    Dion Samson MD Pediatrics   Lester Rios, PAOlga LidiaC  Marcelle Marie, CNP MD Teagan Luna APRN CNP   MD Sanam Wen MD Deborah Mielke, MD Kim Thein, APRN CNP      Clinic hours: Monday - Thursday 7 am-7 pm; Fridays 7 am-5 pm.   Urgent care: Monday - Friday 11 am-9 pm; Saturday and Sunday 9 am-5 pm.  Pharmacy : Monday -Thursday 8 am-8 pm; Friday 8 am-6 pm; Saturday and Sunday 9 am-5 pm.     Clinic: [phone]   Pharmacy: [phone]        Vertigo (Unknown Cause)    In addition to helping with hearing, the inner ear is part of the balance center of your body. Problems with the inner ear can a false feeling of motion. This is called vertigo. Often, it feels as if you or the room is spinning. A vertigo attack may cause sudden nausea,  vomiting and heavy sweating. Severe vertigo causes a loss of balance and can cause you to fall. During vertigo, small head movements and changes in body position will often make the symptoms worse. You may also have ringing in the ears called tinnitus.  An episode of vertigo may last seconds, minutes or hours. Once you are over the first episode, it may never come back. However, symptoms may return off and on.  The cause of your vertigo is not yet known. Possible causes of vertigo include:    Inflammation of the inner ear    Disease of the nerves to the inner ear    Movement of calcium particles in the inner ear    Poor blood flow to the balance centers of the brain    Migraine headaches    In older adults, the use of more than one medicine along with some health conditions  Home care    If symptoms are severe, rest quietly in bed. Change positions very slowly. There is usually one position that will feel best, such as lying on one side or lying on your back with your head slightly raised on pillows. Until you have no symptoms, you are at a higher risk of falling. Let someone help you when you get up. Get rid of home hazards such as loose electrical cords and throw rugs. Don t walk in unfamiliar areas that are not lighted. Use night lights in bathrooms and kitchen areas.    Do not drive a car or work with dangerous machinery until symptoms have been gone for at least one week.    Take medicine as prescribed to relieve your symptoms. Unless another medicine was prescribed for symptoms of nausea, vomiting, and dizziness, you may use over-the-counter motion sickness pills. Ask your pharmacist for suggestions.  Follow-up care  Follow up with your healthcare provider or as directed. If you are referred to a specialist or for testing, make the appointment promptly.  When to seek medical advice  Call your healthcare provider if any of the following occur:    Fever of 100.4 F (38 C) or higher, or as directed by your  healthcare provider    Vertigo worsens or is not controlled by prescribed medicine     Repeated vomiting not relieved by prescribed medicine     Severe headache    Confusion    Weakness of an arm or leg or 1 side of the face    Difficulty with speech or vision    Loss of consciousness     Seizure  Date Last Reviewed: 11/1/2017 2000-2017 [employer]. [address] [place] [address]. All rights reserved. This information is not intended as a substitute for professional medical care. Always follow your healthcare professional's instructions.        Tinnitus (Ringing in the Ears)     Treatment may include maskers and hearing aids.     Tinnitus is the term for a noise in your ear not caused by an outside sound. The noise might be a ringing, clicking, hiss, or roar. It can vary in pitch and may be soft or quite loud. For some people, tinnitus is a minor nuisance. But for others, the noise can make it hard to hear, work, and even sleep. When tinnitus can't be cured, a number of treatments may offer relief.  What causes tinnitus?  Loud noises, hearing loss, and ear wax can cause tinnitus. So can certain medicines. Large amounts of aspirin or caffeine are sometimes to blame. In many cases, the exact cause of tinnitus is unknown.  How is tinnitus treated?  Identifying and removing the cause is the best way to treat tinnitus. For that reason, your healthcare provider may refer you to an otolaryngologist (ear, nose, and throat doctor). Your hearing may also be checked by an audiologist (hearing specialist). If you have hearing loss, wearing a hearing aid may help your tinnitus. When the cause can't be found, the tinnitus itself may be treated. Some of the treatments are listed below, and your healthcare provider can tell you more about them:    Maskers are small devices that look like hearing aids. They emit a pleasant sound that helps cover up the ringing in your ears. Hearing aids and maskers are  sometimes used together.    Medicines that treat anxiety and depression may ease tinnitus in some people.    Hypnosis or relaxation therapy may help head noise seem less severe.    Tinnitus retraining therapy combines counseling and maskers. Both can help take your mind off the tinnitus.  For more information    American Speech-Hearing-Language Association [phone] www.nini.org    American Tinnitus Association [phone] www.tayo.org    National [place] on Deafness and other Communication Disorders [phone] www.nidcd.nih.gov   Date Last Reviewed: 7/1/2016 2000-2017 [employer]. [address] [place] [address]. All rights reserved. This information is not intended as a substitute for professional medical care. Always follow your healthcare professional's instructions.        What Is Meniere s Disease?    Meniere s disease is a problem with the inner ear. This part of the ear is responsible for balance as well as hearing. This sheet will tell you more about the disease and its symptoms.  Meniere s disease affects the inner ear  With Meniere s, too much endolymph (fluid) backs up in the canals of the inner ear. This is a condition called endolymphatic hydrops. Extra fluid causes pressure to build up. The canals then swell and can t work right. Swelling in the hearing canal distorts or blocks sound information. Swelling in the balance canals distorts balance information. Distorted information travels from the inner ear to the brain. This causes problems with both balance and hearing. Although Meniere s usually affects only one ear, it can occur in both ears.  Symptoms of Meniere s disease    Vertigo, a spinning or whirling sensation that causes balance problems. During an attack of vertigo, you may have nausea, vomiting, and sweating. Attacks usually begin suddenly. They may last for 20 minutes to several hours. You may have attacks rarely, frequently, or in clusters. The first  attack is usually the most intense.    Problems with hearing. Hearing is often partially or completely lost in the affected ear during vertigo attacks. It s common for hearing to gradually get worse as the illness goes on.    Tinnitus, ringing, buzzing, whistling, or roaring noises in the affected ear. These noises may come and go or may always be present. The noises may get louder just before a vertigo attack.    A feeling of pressure or fullness in the ear. This is sometimes felt most strongly right before a vertigo attack.  You may feel fine between attacks. Or hearing or balance problems may continue between attacks.  Date Last Reviewed: 10/1/2016    7988-2192 [employer]. [address] [place] [address], [place] [address]. All rights reserved. This information is not intended as a substitute for professional medical care. Always follow your healthcare professional's instructions.        Meniere s Disease    Meniere s disease is a chronic recurring condition. It is due to a problem with the inner ear. This is the part of the ear responsible for balance as well as hearing. Symptoms include:    Sudden attacks of vertigo. Vertigo is a spinning or whirling feeling that causes balance problems. These attacks often include nausea, vomiting, and sweating. During an attack of vertigo, head movement and body position changes will worsen symptoms.    Hearing problems. Hearing is often partly or completely lost during the vertigo attack, then comes back. Over time, though, hearing can be affected.    Tinnitus. This is ringing, buzzing, whistling, or roaring noises in the ear. It may come and go or always be present.     Feeling of pressure. You may also have a feeling of pressure or fullness in the ear.  Attacks may occur weeks, months, or even years apart. Each episode of vertigo may last 20 minutes to several hours. The symptoms are due to changes in fluid in the inner ear canals. The exact cause is not  known.   Treatment for Meniere s disease includes lifestyle changes, medicines, and medical procedures. Surgery may be advised in severe cases.  Home care  Medicines  You may be prescribed medicine to take regularly to help prevent attacks. You may also be prescribed medicine to take only during attacks. Take these as directed.  Lifestyle changes  These lifestyle changes can help make attacks less frequent or less severe.     Reduce your salt intake. Eating too much salt can make this condition worse. A common recommendation is to limit sodium to no more than 2,300 mg a day. Talk to your healthcare provider about ways to limit sodium in your diet.    Quit smoking.    Limit alcohol and caffeine.    Try to limit stress.  During Attacks  If an attack is about to start or has started, take any medicine you have been prescribed for an attack. Lie down on a firm surface in a darkened room. Stay as still as possible. Stay away from bright light. Do not try to read or watch TV. Don't get up until the spinning passes.  Follow-up care  Follow up with your healthcare provider for further assessment and treatment. If you have been referred to a specialist, make an appointment right away.  When to seek medical advice  Call your healthcare provider right away if you have:    Symptoms that get worse    Weakness that gets worse    Fainting    Headache or unusual drowsiness    Trouble with speech or movement    Fever of 100.4 F (38 C) or higher, or as directed by your healthcare provider  Date Last Reviewed: 10/1/2017    2758-9739 [employer]. [address] [place] [address], [place] [address]. All rights reserved. This information is not intended as a substitute for professional medical care. Always follow your healthcare professional's instructions.

## 2018-09-10 ENCOUNTER — OFFICE VISIT (OUTPATIENT)
Dept: FAMILY MEDICINE | Facility: CLINIC | Age: 33
End: 2018-09-10
Payer: COMMERCIAL

## 2018-09-10 VITALS
SYSTOLIC BLOOD PRESSURE: 130 MMHG | TEMPERATURE: 98.8 F | WEIGHT: 159 LBS | BODY MASS INDEX: 22.76 KG/M2 | DIASTOLIC BLOOD PRESSURE: 74 MMHG | HEART RATE: 84 BPM | RESPIRATION RATE: 14 BRPM | HEIGHT: 70 IN | OXYGEN SATURATION: 100 %

## 2018-09-10 DIAGNOSIS — L29.0 PERIANAL IRRITATION: Primary | ICD-10-CM

## 2018-09-10 DIAGNOSIS — Z28.21 VACCINATION NOT CARRIED OUT BECAUSE OF PATIENT REFUSAL: ICD-10-CM

## 2018-09-10 PROCEDURE — 99213 OFFICE O/P EST LOW 20 MIN: CPT | Performed by: FAMILY MEDICINE

## 2018-09-10 PROCEDURE — 99207 ZZC FOR CODING REVIEW: CPT | Performed by: FAMILY MEDICINE

## 2018-09-10 RX ORDER — HYDROCORTISONE ACETATE 25 MG/1
25 SUPPOSITORY RECTAL 2 TIMES DAILY
Qty: 24 SUPPOSITORY | Refills: 0 | Status: SHIPPED | OUTPATIENT
Start: 2018-09-10 | End: 2018-09-16

## 2018-09-10 ASSESSMENT — PAIN SCALES - GENERAL: PAINLEVEL: MODERATE PAIN (5)

## 2018-09-10 NOTE — MR AVS SNAPSHOT
After Visit Summary   9/10/2018    Keesha Norton    MRN: [id number]           Patient Information     Date Of Birth          1985        Visit Information        Provider Department      9/10/2018 3:00 PM Lalo John MD [facility]        Today's Diagnoses     Perianal irritation    -  1      Care Instructions    At [facility], we strive to deliver an exceptional experience to you, every time we see you.  If you receive a survey in the mail, please send us back your thoughts. We really do value your feedback.    Based on your medical history, these are the current health maintenance/preventive care services that you are due for (some may have been done at this visit.)  Health Maintenance Due   Topic Date Due     INFLUENZA VACCINE (1) 09/01/2018         Suggested websites for health information:  Www.[employer] : Up to date and easily searchable information on multiple topics.  Www.medlineplus.gov : medication info, interactive tutorials, watch real surgeries online  Www.familydoctor.org : good info from the Academy of Family Physicians  Www.cdc.gov : public health info, travel advisories, epidemics (H1N1)  Www.aap.org : children's health info, normal development, vaccinations  Www.health.[facility].[place].us : [place] dept of health, public health issues in [place], N1N1    Your care team:                            Family Medicine Internal Medicine   MD Jose Manuel Patel MD Shantel Branch-Fleming, MD Katya Georgiev PA-C Nam Ho, MD Pediatrics   ADITYA Cruz, MD Sanam Knight CNP, MD Deborah Mielke, MD Kim Thein, APRN CNP      Clinic hours: Monday - Thursday 7 am-7 pm; Fridays 7 am-5 pm.   Urgent care: Monday - Friday 11 am-9 pm; Saturday and Sunday 9 am-5 pm.  Pharmacy : Monday -Thursday 8 am-8 pm; Friday 8 am-6 pm; Saturday and Sunday 9 am-5 pm.     Clinic: [phone]   " Pharmacy: [phone]            Follow-ups after your visit        Who to contact     If you have questions or need follow up information about today's clinic visit or your schedule please contact [facility] directly at [phone].  Normal or non-critical lab and imaging results will be communicated to you by MyChart, letter or phone within 4 business days after the clinic has received the results. If you do not hear from us within 7 days, please contact the clinic through [employer] or phone. If you have a critical or abnormal lab result, we will notify you by phone as soon as possible.  Submit refill requests through [employer] or call your pharmacy and they will forward the refill request to us. Please allow 3 business days for your refill to be completed.          Additional Information About Your Visit        MyChart Information     [employer] gives you secure access to your electronic health record. If you see a primary care provider, you can also send messages to your care team and make appointments. If you have questions, please call your primary care clinic.  If you do not have a primary care provider, please call [phone] and they will assist you.        Care EveryWhere ID     This is your Care EveryWhere ID. This could be used by other organizations to access your [place] medical records  [id number]        Your Vitals Were     Pulse Temperature Respirations Height Pulse Oximetry BMI (Body Mass Index)    84 98.8  F (37.1  C) (Oral) 14 5' 10.47\" (1.79 m) 100% 22.51 kg/m2       Blood Pressure from Last 3 Encounters:   09/10/18 130/74   07/10/18 127/73   06/19/18 120/71    Weight from Last 3 Encounters:   09/10/18 159 lb (72.1 kg)   07/10/18 159 lb (72.1 kg)   06/19/18 156 lb (70.8 kg)              Today, you had the following     No orders found for display         Today's Medication Changes          These changes are accurate as of 9/10/18  3:38 PM.  If you have any questions, ask " your nurse or doctor.               Start taking these medicines.        Dose/Directions    hydrocortisone 25 MG Suppository   Commonly known as:  ANUSOL-HC   Used for:  Perianal irritation   Started by:  Lalo John MD        Dose:  25 mg   Place 1 suppository (25 mg) rectally 2 times daily for 6 days   Quantity:  24 suppository   Refills:  0            Where to get your medicines      These medications were sent [place] [facility] [place] - [address]  [address], [facility] [address]     Phone:  [phone]     hydrocortisone 25 MG Suppository                Primary Care Provider Office Phone # Fax #    Zainab Geraldine Fish [phone]       [address]  [facility] [address]        Equal Access to Services     [facility]: Hadii aad ku hadasho Soomaali, waaxda luqadaha, qaybta kaalmada adeegyada, jeffery ramirez hayjonna hawley . So [facility] [phone].    ATENCIÓN: Si habla español, tiene a [place] disposición servicios gratuitos de asistencia lingüística. [facility] [phone].    We comply with applicable federal civil rights laws and [place] laws. We do not discriminate on the basis of race, color, national origin, age, disability, sex, sexual orientation, or gender identity.            Thank you!     Thank you for choosing [facility]  for your care. Our goal is always to provide you with excellent care. Hearing back from our patients is one way we can continue to improve our services. Please take a few minutes to complete the written survey that you may receive in the mail after your visit with us. Thank you!             Your Updated Medication List - Protect others around you: Learn how to safely use, store and throw away your medicines at www.disposemymeds.org.          This list is accurate as of 9/10/18  3:38 PM.  Always use your most recent med list.                   Brand Name Dispense  Instructions for use Diagnosis    hydrocortisone 25 MG Suppository    ANUSOL-HC    24 suppository    Place 1 suppository (25 mg) rectally 2 times daily for 6 days    Perianal irritation       meclizine 25 MG tablet    ANTIVERT    60 tablet    Take 1 tablet (25 mg) by mouth 3 times daily as needed for dizziness    Vertigo

## 2018-09-10 NOTE — PROGRESS NOTES
"  SUBJECTIVE:   Keesha Norton is a 33 year old male who presents to clinic today for the following health issues:      Concern - Burning in genital area  Onset: June    Description:   Burning in groin and anal area    Intensity: mild    Progression of Symptoms: intermittent. Had been improving but worsening again over the last few weeks and now the burning sensation has spread to the anal region as well    Accompanying Signs & Symptoms:   White tongue for 3 weeks    Previous history of similar problem:   Yes - was seen in clinic 6/15/18 and 6/19/18  Patient reports history of H. Pylori 4 years ago    Precipitating factors:   Worsened by: caffeine    Alleviating factors:  Improved by: low sugar diet    Therapies Tried and outcome: low sugar diet    Past medical, family, and social histories, medications, and allergies are reviewed and updated in Epic.     ROS:  CONSTITUTIONAL: NEGATIVE for fever, chills, change in weight  ENT/MOUTH: NEGATIVE for ear, mouth and throat problems  RESP: NEGATIVE for significant cough or SOB  CV: NEGATIVE for chest pain, palpitations or peripheral edema  ROS otherwise negative    This document serves as a record of the services and decisions personally performed and made by Dr. John. It was created on his behalf by Alisa Moore, a trained medical scribe. The creation of this document is based the provider's statements to the medical scribe.  Alisa Moore September 10, 2018 3:02 PM     OBJECTIVE:                                                    /74 (BP Location: Left arm, Patient Position: Chair, Cuff Size: Adult Regular)  Pulse 84  Temp 98.8  F (37.1  C) (Oral)  Resp 14  Ht 1.79 m (5' 10.47\")  Wt 72.1 kg (159 lb)  SpO2 100%  BMI 22.51 kg/m2   Body mass index is 22.51 kg/(m^2).     GENERAL: healthy, alert and no distress  EYES: Eyes grossly normal to inspection, PERRL and conjunctivae and sclerae normal  RESP: lungs clear to auscultation - no rales, rhonchi or wheezes  CV: " regular rate and rhythm, normal S1 S2, no S3 or S4, no murmur, click or rub, no peripheral edema and peripheral pulses strong  ABDOMEN: soft, nontender, no hepatosplenomegaly, no masses and bowel sounds normal   (male): testicles normal without atrophy or masses, penis normal without urethral discharge and no evidence for balanitis  RECTAL (male): no erythema or pigment changes of the perianal skin, no anal inflammation, fissure, or bleeding. SERG deferred.  MS: no gross musculoskeletal defects noted, no edema  SKIN: no suspicious lesions or rashes  NEURO: Normal strength and tone, mentation intact and speech normal  PSYCH: mentation appears normal, affect normal/bright    Diagnostic Test Results:  none      ASSESSMENT/PLAN:                                                      (L29.0) Perianal irritation  (primary encounter diagnosis)  Comment: the patient denies recent foods that would typically cause burning in this area. He may benefit from a topical steroid. Otherwise his various symptoms of abdominal pain, urethral burning, previously suspected prostatitis, and flank pains have yielded a negative workup  Plan: hydrocortisone (ANUSOL-HC) 25 MG Suppository        I encouraged him to see a urologist (referred previously). Handouts on FODMAP diet provided. Consider colonoscopy.    (Z28.21) Vaccination not carried out because of patient refusal  Comment: Influenza vaccine offered but declined by the patient.   Plan:         The information in this document, created by the medical scribe for me, accurately reflects the services I personally performed and the decisions made by me. I have reviewed and approved this document for accuracy prior to leaving the patient care area. September 10, 2018 3:02 PM     Lalo John MD

## 2018-09-10 NOTE — PATIENT INSTRUCTIONS
At [facility], we strive to deliver an exceptional experience to you, every time we see you.  If you receive a survey in the mail, please send us back your thoughts. We really do value your feedback.    Based on your medical history, these are the current health maintenance/preventive care services that you are due for (some may have been done at this visit.)  Health Maintenance Due   Topic Date Due     INFLUENZA VACCINE (1) 09/01/2018         Suggested websites for health information:  Www.[employer].org : Up to date and easily searchable information on multiple topics.  Www.medlineplus.gov : medication info, interactive tutorials, watch real surgeries online  Www.familydoctor.org : good info from the Academy of Family Physicians  Www.cdc.gov : public health info, travel advisories, epidemics (H1N1)  Www.aap.org : children's health info, normal development, vaccinations  Www.health.[facility].[place].us : [place] dept of health, public health issues in [place], N1N1    Your care team:                            Family Medicine Internal Medicine   MD Jose Manuel Patel MD Shantel Branch-Fleming, MD Katya Georgiev PA-C Nam Ho, MD Pediatrics   ADITYA Cruz, CNP MD Sanam Patten CNP, MD Deborah Mielke, MD Kim Thein, APRN CNP      Clinic hours: Monday - Thursday 7 am-7 pm; Fridays 7 am-5 pm.   Urgent care: Monday - Friday 11 am-9 pm; Saturday and Sunday 9 am-5 pm.  Pharmacy : Monday -Thursday 8 am-8 pm; Friday 8 am-6 pm; Saturday and Sunday 9 am-5 pm.     Clinic: [phone]   Pharmacy: [phone]

## 2018-09-18 ENCOUNTER — OFFICE VISIT (OUTPATIENT)
Dept: UROLOGY | Facility: CLINIC | Age: 33
End: 2018-09-18
Attending: FAMILY MEDICINE
Payer: COMMERCIAL

## 2018-09-18 ENCOUNTER — TELEPHONE (OUTPATIENT)
Dept: UROLOGY | Facility: CLINIC | Age: 33
End: 2018-09-18

## 2018-09-18 ENCOUNTER — OFFICE VISIT (OUTPATIENT)
Dept: GASTROENTEROLOGY | Facility: CLINIC | Age: 33
End: 2018-09-18
Payer: COMMERCIAL

## 2018-09-18 VITALS
SYSTOLIC BLOOD PRESSURE: 121 MMHG | DIASTOLIC BLOOD PRESSURE: 76 MMHG | HEART RATE: 89 BPM | WEIGHT: 158 LBS | OXYGEN SATURATION: 100 % | BODY MASS INDEX: 22.37 KG/M2

## 2018-09-18 VITALS
BODY MASS INDEX: 22.37 KG/M2 | WEIGHT: 158 LBS | SYSTOLIC BLOOD PRESSURE: 121 MMHG | OXYGEN SATURATION: 100 % | HEART RATE: 89 BPM | DIASTOLIC BLOOD PRESSURE: 76 MMHG

## 2018-09-18 DIAGNOSIS — R10.2 PELVIC PAIN IN MALE: Primary | ICD-10-CM

## 2018-09-18 DIAGNOSIS — K62.89 RECTAL PAIN: ICD-10-CM

## 2018-09-18 DIAGNOSIS — R31.29 MICROSCOPIC HEMATURIA: ICD-10-CM

## 2018-09-18 DIAGNOSIS — K62.89 RECTAL PAIN: Primary | ICD-10-CM

## 2018-09-18 LAB
ALBUMIN UR-MCNC: NEGATIVE MG/DL
APPEARANCE UR: CLEAR
BILIRUB UR QL STRIP: NEGATIVE
COLOR UR AUTO: YELLOW
GLUCOSE UR STRIP-MCNC: NEGATIVE MG/DL
HGB UR QL STRIP: ABNORMAL
KETONES UR STRIP-MCNC: NEGATIVE MG/DL
LEUKOCYTE ESTERASE UR QL STRIP: NEGATIVE
NITRATE UR QL: NEGATIVE
PH UR STRIP: 6 PH (ref 5–7)
RBC #/AREA URNS AUTO: ABNORMAL /HPF
SOURCE: ABNORMAL
SP GR UR STRIP: 1.01 (ref 1–1.03)
UROBILINOGEN UR STRIP-MCNC: NORMAL MG/DL (ref 0–2)
WBC #/AREA URNS AUTO: ABNORMAL /HPF

## 2018-09-18 PROCEDURE — 99204 OFFICE O/P NEW MOD 45 MIN: CPT | Performed by: INTERNAL MEDICINE

## 2018-09-18 PROCEDURE — 99204 OFFICE O/P NEW MOD 45 MIN: CPT | Performed by: UROLOGY

## 2018-09-18 PROCEDURE — 88112 CYTOPATH CELL ENHANCE TECH: CPT | Performed by: UROLOGY

## 2018-09-18 PROCEDURE — 81001 URINALYSIS AUTO W/SCOPE: CPT | Performed by: UROLOGY

## 2018-09-18 ASSESSMENT — PAIN SCALES - GENERAL
PAINLEVEL: SEVERE PAIN (6)
PAINLEVEL: SEVERE PAIN (6)

## 2018-09-18 NOTE — PATIENT INSTRUCTIONS
Schedule flexible sigmoidoscopy for further evaluation with Dr Crabtree. Call [phone] to schedule

## 2018-09-18 NOTE — MR AVS SNAPSHOT
After Visit Summary   9/18/2018    Keesha Norton    MRN: [id number]           Patient Information     Date Of Birth          1985        Visit Information        Provider Department      9/18/2018 12:00 PM Dank Crabtree MD [facility]        Today's Diagnoses     Rectal pain    -  1      Care Instructions    Schedule flexible sigmoidoscopy for further evaluation with Dr Crabtree. Call [phone] to schedule           Follow-ups after your visit        Additional Services     GASTROENTEROLOGY ADULT REF PROCEDURE ONLY [facility] [phone] ([place]); [facility]                 Follow-up notes from your care team     Return if symptoms worsen or fail to improve.      Your next 10 appointments already scheduled     Sep 18, 2018 12:00 PM CDT   New Visit with Dank Crabtree MD   [facility] ([facility])    [address] [place] [id number] [phone]              Future tests that were ordered for you today     Open Future Orders        Priority Expected Expires Ordered    GASTROENTEROLOGY ADULT REF PROCEDURE ONLY [facility] [phone] ([place]); [facility] GI Routine  9/18/2019 9/18/2018            Who to contact     If you have questions or need follow up information about today's clinic visit or your schedule please contact [facility] directly at [phone].  Normal or non-critical lab and imaging results will be communicated to you by MyChart, letter or phone within 4 business days after the clinic has received the results. If you do not hear from us within 7 days, please contact the clinic through MyChart or phone. If you have a critical or abnormal lab result, we will notify you by phone as soon as possible.  Submit refill requests through [employer] or call your pharmacy and they will forward the refill request to us. Please allow 3 business days for your refill to be  completed.          Additional Information About Your Visit        to-[email or web address] Information     [employer] gives you secure access to your electronic health record. If you see a primary care provider, you can also send messages to your care team and make appointments. If you have questions, please call your primary care clinic.  If you do not have a primary care provider, please call [phone] and they will assist you.      [employer] is an electronic gateway that provides easy, online access to your medical records. With [employer], you can request a clinic appointment, read your test results, renew a prescription or communicate with your care team.     To access your existing account, please contact your [facility] or call [phone] for assistance.        Care EveryWhere ID     This is your Care EveryWhere ID. This could be used by other organizations to access your [facility] medical records  [id number]        Your Vitals Were     Pulse Pulse Oximetry BMI (Body Mass Index)             89 100% 22.37 kg/m2          Blood Pressure from Last 3 Encounters:   09/18/18 121/76   09/18/18 121/76   09/10/18 130/74    Weight from Last 3 Encounters:   09/18/18 71.7 kg (158 lb)   09/18/18 71.7 kg (158 lb)   09/10/18 72.1 kg (159 lb)               Primary Care Provider Office Phone # Fax #    Zainab Lrbalaji Fish [phone]       [address]  [facility] [address]        Equal Access to Services     [facility] Center[facility] : Hadii aad ku hadasho Soomaali, waaxda luqadaha, qaybta kaalmada adeegyada, jeffery hawley . So [facility] [phone].    ATENCIÓN: Si habla español, tiene a [place] disposición servicios gratuitos de asistencia lingüística. Llame al [phone].    We comply with applicable federal civil rights laws and [place] laws. We do not discriminate on the basis of race, color, national origin, age, disability, sex, sexual orientation, or gender  identity.            Thank you!     Thank you for choosing [facility]  for your care. Our goal is always to provide you with excellent care. Hearing back from our patients is one way we can continue to improve our services. Please take a few minutes to complete the written survey that you may receive in the mail after your visit with us. Thank you!             Your Updated Medication List - Protect others around you: Learn how to safely use, store and throw away your medicines at www.disposemymeds.org.          This list is accurate as of 9/18/18 10:43 AM.  Always use your most recent med list.                   Brand Name Dispense Instructions for use Diagnosis    meclizine 25 MG tablet    ANTIVERT    60 tablet    Take 1 tablet (25 mg) by mouth 3 times daily as needed for dizziness    Vertigo

## 2018-09-18 NOTE — PROGRESS NOTES
GASTROENTEROLOGY NEW PATIENT CLINIC VISIT    CC/REFERRING MD:    Zainab Handy Dr.    REASON FOR CONSULTATION:   No ref. provider found for   Chief Complaint   Patient presents with     Consult     rectal pain       HISTORY OF PRESENT ILLNESS:    Keesha Norton is 33 year old male who presents for evaluation of rectal pain.  He notes a history of rectal pain which is been going on for the last 3 months.  He notes that this was originally bilateral inguinal pain with urinary symptoms.  He had been undergoing evaluation with urology and was actually there today but was referred over here for further evaluation of rectal pain.  He notes that he has pain which comes on approximately 1 hour after having a bowel movement.  It is described as a dull pain in his rectum, is not crampy and there is not associated tenesmus.  The pain then resolved spontaneously over a period of several hours.  He does note that he has had a change in his bowel habits associated with the symptoms.  He has periodic times of constipation and other times looser stools.  He feels that there is some change in his symptoms based on oral intake most notably increased symptoms with tomato or coffee.  He notes that he has an unintended 10 pound weight loss over the past year.  He notes that he not infrequently has small amounts of anal leakage and is noted increased mucus in the stools recently.   History includes prior H. pylori gastric ulcers diagnosed by EGD.    PREVIOUS ENDOSCOPY:  2015 EGD: Normal esophagus, normal duodenum, stomach with mild inflammation characterized by congestion and erythema with stigmata of prior ulcers.    PROBLEM LIST  Patient Active Problem List    Diagnosis Date Noted     History of gastric ulcer 06/16/2018     Priority: Medium     Associated with Helicobacter pylori        Herpes zoster without complication 02/20/2017     Priority: Medium     Right flank pain 02/20/2017     Priority:  Medium     Gilbert's disease 09/17/2015     Priority: Medium     GERD (gastroesophageal reflux disease) 01/23/2015     Priority: Medium     Vitamin D insufficiency 01/07/2015     Priority: Medium     Elevated bilirubin 01/07/2015     Priority: Medium     CARDIOVASCULAR SCREENING; LDL GOAL LESS THAN 160 10/21/2013     Priority: Medium       PERTINENT PAST MEDICAL HISTORY:  (I personally reviewed this history with the patient at today's visit)   Past Medical History:   Diagnosis Date     Gastric ulcer 2015     Gilbert syndrome      Helicobacter pylori (H. pylori) infection          PREVIOUS SURGERIES: (I personally reviewed this history with the patient at today's visit)   Past Surgical History:   Procedure Laterality Date     ESOPHAGOSCOPY, GASTROSCOPY, DUODENOSCOPY (EGD), COMBINED N/A 2/13/2015    Procedure: COMBINED ESOPHAGOSCOPY, GASTROSCOPY, DUODENOSCOPY (EGD);  Surgeon: Jair Dickinson MD;  Location: [facility] OR     ESOPHAGOSCOPY, GASTROSCOPY, DUODENOSCOPY (EGD), COMBINED Left 2/13/2015    Procedure: COMBINED ESOPHAGOSCOPY, GASTROSCOPY, DUODENOSCOPY (EGD), BIOPSY SINGLE OR MULTIPLE;  Surgeon: Jair Dickinson MD;  Location: [facility]     ESOPHAGOSCOPY, GASTROSCOPY, DUODENOSCOPY (EGD), COMBINED N/A 4/6/2015    Procedure: COMBINED ESOPHAGOSCOPY, GASTROSCOPY, DUODENOSCOPY (EGD);  Surgeon: Arsen Bashir MD;  Location: [facility] OR     HC TOOTH EXTRACTION W/FORCEP           ALLERGIES:     Allergies   Allergen Reactions     Seasonal Allergies        PERTINENT MEDICATIONS:    Current Outpatient Prescriptions:      meclizine (ANTIVERT) 25 MG tablet, Take 1 tablet (25 mg) by mouth 3 times daily as needed for dizziness (Patient not taking: Reported on 9/10/2018), Disp: 60 tablet, Rfl: 0    SOCIAL HISTORY:  Social History     Social History     Marital status:      Spouse name: N/A     Number of children: N/A     Years of education: N/A     Occupational History     Not on file.     Social History Main Topics      Smoking status: Never Smoker     Smokeless tobacco: Never Used     Alcohol use Yes      Comment: 2-3 drinks a week     Drug use: No     Sexual activity: Yes     Partners: Female     Birth control/ protection: Pull-out method      Comment:  one partner     Other Topics Concern     Parent/Sibling W/ Cabg, Mi Or Angioplasty Before 65f 55m? No     Social History Narrative       FAMILY HISTORY: (I personally reviewed this history with the patient at today's visit)  Family History   Problem Relation Age of Onset     Hypertension Mother      Prostate Cancer Maternal Grandfather      Coronary Artery Disease Maternal Grandfather      Cerebrovascular Disease Paternal Grandmother      Asthma Maternal Uncle      Diabetes Paternal Aunt      Cancer - colorectal No family hx of      Breast Cancer No family hx of      Thyroid Disease No family hx of      Cancer No family hx of      Glaucoma No family hx of      Macular Degeneration No family hx of      Hyperlipidemia No family hx of      Ovarian Cancer No family hx of      Thyroid Disease No family hx of           ROS:    No fevers or chills  No blurry vision, double vision or change in vision  No sore throat  No lymphadenopathy  No headache, paraesthesias, or weakness in a limb  No shortness of breath or wheezing  No chest pain or pressure  No arthralgias or myalgias  No rashes or skin changes  No odynophagia or dysphagia  No BRBPR, hematochezia, melena  No dysuria, frequency or urgency  No hot/cold intolerance or polyria  No anxiety or depression  PHYSICAL EXAMINATION:  Constitutional: aaox3, cooperative, pleasant, not dyspneic/diaphoretic, no acute distress  Vitals reviewed: /76  Pulse 89  Wt 71.7 kg (158 lb)  SpO2 100%  BMI 22.37 kg/m2  Wt:   Wt Readings from Last 2 Encounters:   09/18/18 71.7 kg (158 lb)   09/18/18 71.7 kg (158 lb)      Eyes: Sclera anicteric/injected  Ears/nose/mouth/throat: Normal oropharynx without ulcers or exudate, mucus membranes  moist, hearing intact  Neck: supple, thyroid normal size  CV: No edema  Respiratory: Unlabored breathing  Lymph: No submandibular, supraclavicular or inguinal lymphadenopathy  Abd:  Nondistended, no masses, +bs, no hepatosplenomegaly, nontender, no peritoneal signs  Skin: warm, perfused, no jaundice  Psych: Normal affect  MSK: Normal gait      PERTINENT STUDIES: (I personally reviewed these laboratory studies today)  Most recent CBC:   Recent Labs   Lab Test  06/15/18   1651  02/27/18   1715   WBC  6.9  11.1*   HGB  16.2  16.6   HCT  46.8  48.5   PLT  201  223     Most recent hepatic panel:  Recent Labs   Lab Test  02/27/18   1715  02/20/17   1216   ALT  16  20   AST  15  18     Most recent creatinine:  Recent Labs   Lab Test  02/27/18   1715  11/27/17   1800   CR  0.87  0.90       RADIOLOGY:      Examination:  CT ABDOMEN PELVIS W/O CONTRAST 6/18/2018 1:48 PM      History: Dysuria, microhematuria, flank pain     Comparison: Abdominal ultrasound 2/21/2017     Technique: CT of the abdomen and pelvis were obtained without contrast  per renal stone protocol. Sagittal and coronal reconstructions created  and reviewed.     Findings:   The liver, spleen, right adrenal gland, and pancreas are unremarkable  on this noncontrast exam.  Nonspecific left adrenal gland thickening.   The gallbladder is unremarkable. No renal calculi. No hydronephrosis.  Urinary bladder is unremarkable.     The small and large bowel are normal in caliber. The appendix is  visualized and unremarkable. No abdominal free fluid or free air.  Scattered retroperitoneal and mesenteric lymph nodes which are not  pathologically enlarged. No abdominal aortic aneurysm.     No acute osseous abnormalities. The lung bases are clear.          Impression: No hydronephrosis or renal calculi. No finding to explain  dysuria or hematuria.  CT urogram could be considered to further  evaluate microhematuria as indicated.     I have personally reviewed the examination  and initial interpretation  and I agree with the findings.     WILIAM KEY MD    ASSESSMENT/PLAN:    Keesha Norton is a 33 year old male who presents for evaluation of rectal pain and change in bowel habits.  The presentation of postdefacatory rectal pain, change in bowel habits, and unintended weight loss is concerning for the possibility of proctitis and neoplasia should be ruled out.  We will plan for flexible sigmoidoscopy for further evaluation.  At this time, hold off on full colonoscopy given localized symptoms.    Additional evaluation is required at the present time.  Rectal pain  Orders Placed This Encounter   Procedures     GASTROENTEROLOGY ADULT REF PROCEDURE ONLY [facility] [phone] ([place]); [facility] GI         RTC depending on endoscopy results    Thank you for this consultation.  It was a pleasure to participate in the care of this patient; please contact us with any further questions.      This note was created with voice recognition software, and while reviewed for accuracy, typos may remain.     Dank Crabtree MD  Adjunct  of Medicine  Division of Gastroenterology, Hepatology and Nutrition  [facility]  [phone]

## 2018-09-18 NOTE — PROGRESS NOTES
"Urology Consult History and Physical    Name: Keesha Norton    MRN: [id number]   YOB: 1985       We were asked to see Keesha Norton at the request of Dr. John for evaluation and treatment of Pelvic pain.        Chief Complaint:   Pelvic pain    History is obtained from the patient            History of Present Illness:   Keesha Norton is a 33 year old male who is being seen for evaluation of pelvic pain.  1 year ago developed mild bilateral inguinal pain and cloudy urine. This was intermittent and resolved. He was treated with 4 week course of antibiotics for possible \"prostatitis.\"  Has since developed some pelvic pain for the past few months, located mainly in the posterior pelvis near the rectum.   He reports sensation of burning following bowel movements - gradual onset of 1-2 hours following bowel movements and this lasts the rest of the day. No issues overnight.  Occurs most days.  Burning pain is also located in the low back and at times feels like \"nerve pain\" in his buttocks and legs.  Note some staining of underwear from anal leakage  No dysuria, urinary frequency or urgency, hematuria. Single episode of microscopic hematuria in June.     Past medical history Gilbert     (4 or >)  Location: Rectum  Quality: Burning   Severity: Moderate  Duration: 2-3 months  Timing: daily  Context: Following bowel movements             Past Medical History:     Past Medical History:   Diagnosis Date     Gastric ulcer 2015     Helicobacter pylori (H. pylori) infection             Past Surgical History:     Past Surgical History:   Procedure Laterality Date     ESOPHAGOSCOPY, GASTROSCOPY, DUODENOSCOPY (EGD), COMBINED N/A 2/13/2015    Procedure: COMBINED ESOPHAGOSCOPY, GASTROSCOPY, DUODENOSCOPY (EGD);  Surgeon: Jair Dickinson MD;  Location: [facility]     ESOPHAGOSCOPY, GASTROSCOPY, DUODENOSCOPY (EGD), COMBINED Left 2/13/2015    Procedure: COMBINED ESOPHAGOSCOPY, GASTROSCOPY, DUODENOSCOPY (EGD), " BIOPSY SINGLE OR MULTIPLE;  Surgeon: Jair Dickinson MD;  Location: [facility]     ESOPHAGOSCOPY, GASTROSCOPY, DUODENOSCOPY (EGD), COMBINED N/A 4/6/2015    Procedure: COMBINED ESOPHAGOSCOPY, GASTROSCOPY, DUODENOSCOPY (EGD);  Surgeon: Arsen Bashir MD;  Location: [facility] OR     HC TOOTH EXTRACTION W/FORCEP              Social History:     Social History   Substance Use Topics     Smoking status: Never Smoker     Smokeless tobacco: Never Used     Alcohol use Yes      Comment: 2-3 drinks a week       History   Smoking Status     Never Smoker   Smokeless Tobacco     Never Used            Family History:     Family History   Problem Relation Age of Onset     Hypertension Mother      Prostate Cancer Maternal Grandfather      Coronary Artery Disease Maternal Grandfather      Cerebrovascular Disease Paternal Grandmother      Asthma Maternal Uncle      Diabetes Paternal Aunt      Cancer - colorectal No family hx of      Breast Cancer No family hx of      Thyroid Disease No family hx of      Cancer No family hx of      Glaucoma No family hx of      Macular Degeneration No family hx of      Hyperlipidemia No family hx of      Ovarian Cancer No family hx of      Thyroid Disease No family hx of               Allergies:     Allergies   Allergen Reactions     Seasonal Allergies             Medications:     Current Outpatient Prescriptions   Medication Sig     meclizine (ANTIVERT) 25 MG tablet Take 1 tablet (25 mg) by mouth 3 times daily as needed for dizziness (Patient not taking: Reported on 9/10/2018)     No current facility-administered medications for this visit.              Review of Systems:     Skin: negative  Eyes: negative  Ears/Nose/Throat: negative  Respiratory: No shortness of breath, dyspnea on exertion, cough, or hemoptysis  Cardiovascular: negative  Gastrointestinal: as above  Genitourinary: as above  Musculoskeletal: negative  Neurologic: negative  Psychiatric: negative  Hematologic/Lymphatic/Immunologic:  negative  Endocrine: negative          Physical Exam:   Patient Vitals for the past 24 hrs:   BP Pulse SpO2 Weight   09/18/18 0951 121/76 89 100 % 71.7 kg (158 lb)     Body mass index is 22.37 kg/(m^2).     General: age-appropriate appearing male in NAD  HEENT: Head AT/NC, EOMI, CN Grossly intact  Lungs: no respiratory distress, or pursed lip breathing  Heart: No obvious jugular venous distension present  Back: no bony midline tenderness, no CVAT bilaterally.  Abdomen: soft, non-distended, non-tender. No organomegaly  : normal male external genitalia. Normal uncircumcised phallus  Rectal: ~20cc prostate, non-tender, normal consistently, non-boggy without induration  Lymph: no palpable inguinal lymphadenopathy.  LE: no edema.   Musculoskeltal: extremities normal, no peripheral edema  Skin: no suspicious lesions or rashes  Neuro: Alert, oriented, speech and mentation normal;  moving all 4 extremities equally.  Psych: affect and mood normal          Data:   All laboratory data reviewed:    UA RESULTS:  Recent Labs   Lab Test  06/15/18   1550   COLOR  Yellow   APPEARANCE  Clear   URINEGLC  Negative   URINEBILI  Negative   URINEKETONE  Negative   SG  1.025   UBLD  Small*   URINEPH  6.0   PROTEIN  Negative   UROBILINOGEN  0.2   NITRITE  Negative   LEUKEST  Negative   RBCU  2-5*   WBCU  0 - 5      PSA   Date Value Ref Range Status   06/19/2018 0.53 0 - 4 ug/L Final     Comment:     Assay Method:  Chemiluminescence using Siemens Vista analyzer      Lab Results   Component Value Date    CR 0.87 02/27/2018        All pertinent imaging reviewed:    All imaging studies reviewed by me.  I personally reviewed these imaging films. Normal appearing  system  A formal report from radiology will follow.    Impression: No hydronephrosis or renal calculi. No finding to explain  dysuria or hematuria.  CT urogram could be considered to further  evaluate microhematuria as indicated.         Impression and Plan:   Impression:   32 y/o  man with history of pelvic pain mainly associated with bowel movements and no clear urinary symptoms.       Plan:   Pelvic pain  - We discussed that based on the exam and history there is no indication of prostatitis or urinary tract issue  - U/A to assess for microscopic hematuria  - We discussed increased PO water intake to promote clear urine    Rectal pain  - I discussed with my Dr. Crabtree who will see the patient today for further evaluation    Microscopic hematuria  - After the patient left, the U/A resulted again noting microscopic hematuria 2-5 RBCs  - Given this persistence of microscopic hematuria on U/A from today and June, this does warrant a hematuria work up  - Urine cytology ordered as add on today  - CT Urogram - contrast phases only  - Return for cystoscopy  - We will call the patient to arrange     Thank you for the kind consultation.    Time spent: 30 minutes of which >50% was spent counseling.    Dre Lambert MD   Urology  [facility]  [facility] Phone: [phone]  [facility] Phone: [phone]

## 2018-09-18 NOTE — TELEPHONE ENCOUNTER
Called and spoke to patient who is aware of the 9/18/18 UA results, result note and recommendations from Dr. Lambert. Patient aware that the urine cytology test is in process. Per patient, his schedule is full for next week but he would like to complete this testing as soon as possible. Per patient, he will check his schedule and return call to clinic to schedule cysto and CT per Dr. Lambert.    Radha Roper RN, BSN

## 2018-09-18 NOTE — NURSING NOTE
Keesha Norton's goals for this visit include:   Chief Complaint   Patient presents with     Consult     Prostate concern       He requests these members of his care team be copied on today's visit information: Yes    PCP: Zainab Handy    Referring Provider:  Lalo John MD  [address] [place] [address]    /76 (BP Location: Left arm, Patient Position: Sitting, Cuff Size: Adult Regular)  Pulse 89  Wt 71.7 kg (158 lb)  SpO2 100%  BMI 22.37 kg/m2    Do you need any medication refills at today's visit? No

## 2018-09-20 LAB — COPATH REPORT: NORMAL

## 2018-09-20 NOTE — TELEPHONE ENCOUNTER
Chart reviewed. Patient is scheduled for CT scan on 9/24/18. Called and spoke to patient to assist in scheduling cystoscopy with Dr. Lambert. Patient would like to return call to schedule cystoscopy. Per patient request, patient was transferred to the imaging department to possibly reschedule CT to tomorrow.     Radha Roper RN, BSN

## 2018-09-20 NOTE — TELEPHONE ENCOUNTER
Received return call from patient who reports that he rescheduled the CT for tomorrow. Cysto with BENTLEY Lambert scheduled for 9/27/18 at 8:30am.    Radha Roper RN, BSN

## 2018-09-21 ENCOUNTER — RADIANT APPOINTMENT (OUTPATIENT)
Dept: CT IMAGING | Facility: CLINIC | Age: 33
End: 2018-09-21
Attending: UROLOGY
Payer: COMMERCIAL

## 2018-09-21 DIAGNOSIS — R31.29 MICROSCOPIC HEMATURIA: ICD-10-CM

## 2018-09-21 PROCEDURE — 74178 CT ABD&PLV WO CNTR FLWD CNTR: CPT | Performed by: RADIOLOGY

## 2018-09-21 RX ORDER — IOPAMIDOL 755 MG/ML
97 INJECTION, SOLUTION INTRAVASCULAR ONCE
Status: COMPLETED | OUTPATIENT
Start: 2018-09-21 | End: 2018-09-21

## 2018-09-21 RX ORDER — IOPAMIDOL 755 MG/ML
97 INJECTION, SOLUTION INTRAVASCULAR ONCE
Status: DISCONTINUED | OUTPATIENT
Start: 2018-09-21 | End: 2018-09-21

## 2018-09-21 RX ADMIN — IOPAMIDOL 97 ML: 755 INJECTION, SOLUTION INTRAVASCULAR at 13:59

## 2018-09-27 ENCOUNTER — OFFICE VISIT (OUTPATIENT)
Dept: UROLOGY | Facility: CLINIC | Age: 33
End: 2018-09-27
Payer: COMMERCIAL

## 2018-09-27 DIAGNOSIS — R31.29 MICROSCOPIC HEMATURIA: Primary | ICD-10-CM

## 2018-09-27 PROCEDURE — 52000 CYSTOURETHROSCOPY: CPT | Performed by: UROLOGY

## 2018-09-27 NOTE — MR AVS SNAPSHOT
"              After Visit Summary   9/27/2018    Keesha Norton    MRN: [id number]           Patient Information     Date Of Birth          1985        Visit Information        Provider Department      9/27/2018 8:30 AM Dre Lambert MD [facility]        Today's Diagnoses     Microscopic hematuria    -  1      Care Instructions      AFTER YOUR CYSTOSCOPY        You have just completed a cystoscopy, or \"cysto\", which allowed your physician to learn more about your bladder (or to remove a stent placed after surgery). We suggest that you continue to avoid caffeine, fruit juice, and alcohol for the next 24 hours, however, you are encouraged to return to your normal activities.         A few things that are considered normal after your cystoscopy:     * Small amount of bleeding (or spotting) that clears within the next 24 hours     * Slight burning sensation with urination     * Sensation to of needing to avoid more frequently     * The feeling of \"air\" in your urine     * Mild discomfort that is relieved with Tylenol        Please contact our office promptly if you:     * Develop a fever above 101 degrees     * Are unable to urinate     * Develop bright red blood that does not stop     * Severe pain or swelling         Please contact our office with any concerns or questions @[facility].          Follow-ups after your visit        Who to contact     If you have questions or need follow up information about today's clinic visit or your schedule please contact [facility] directly at [phone].  Normal or non-critical lab and imaging results will be communicated to you by MyChart, letter or phone within 4 business days after the clinic has received the results. If you do not hear from us within 7 days, please contact the clinic through MyChart or phone. If you have a critical or abnormal lab result, we will notify you by phone as soon as possible.  Submit refill requests through " [employer] or call your pharmacy and they will forward the refill request to us. Please allow 3 business days for your refill to be completed.          Additional Information About Your Visit        [employer] Information     [employer] gives you secure access to your electronic health record. If you see a primary care provider, you can also send messages to your care team and make appointments. If you have questions, please call your primary care clinic.  If you do not have a primary care provider, please call [phone] and they will assist you.      [employer] is an electronic gateway that provides easy, online access to your medical records. With [employer], you can request a clinic appointment, read your test results, renew a prescription or communicate with your care team.     To access your existing account, please contact your [facility] or call [phone] for assistance.        Care EveryWhere ID     This is your Care EveryWhere ID. This could be used by other organizations to access your [facility] medical records  [id number]         Blood Pressure from Last 3 Encounters:   09/18/18 121/76   09/18/18 121/76   09/10/18 130/74    Weight from Last 3 Encounters:   09/18/18 71.7 kg (158 lb)   09/18/18 71.7 kg (158 lb)   09/10/18 72.1 kg (159 lb)              We Performed the Following     CYSTOURETHROSCOPY        Primary Care Provider Office Phone # Fax #    Zainab Geraldine Fish [phone]       [id number]  [facility] [address]        Equal Access to Services     [facility] : Hadii aad ku hadasho Soomaali, waaxda luqadaha, qaybta kaalmada adeegyada, waxay idiin hayjonna haney'jonna . So [facility] [phone].    ATENCIÓN: Si habla español, tiene a [place] disposición servicios gratuitos de asistencia lingüística. Llame al [phone].    We comply with applicable federal civil rights laws and [place] laws. We do not discriminate on the basis of  race, color, national origin, age, disability, sex, sexual orientation, or gender identity.            Thank you!     Thank you for choosing [facility]  for your care. Our goal is always to provide you with excellent care. Hearing back from our patients is one way we can continue to improve our services. Please take a few minutes to complete the written survey that you may receive in the mail after your visit with us. Thank you!             Your Updated Medication List - Protect others around you: Learn how to safely use, store and throw away your medicines at www.disposemymeds.org.          This list is accurate as of 9/27/18  9:31 AM.  Always use your most recent med list.                   Brand Name Dispense Instructions for use Diagnosis    meclizine 25 MG tablet    ANTIVERT    60 tablet    Take 1 tablet (25 mg) by mouth 3 times daily as needed for dizziness    Vertigo

## 2018-09-27 NOTE — PROGRESS NOTES
CYSTOSCOPY PROCEDURE NOTE:    Keesha Norton is a 33 year old male  who presented on 9/18 for evaluation of pelvic pain. At that time there was no indication that his pain was related to his prostate. He was noted to have one episode of microscopic hematuria in June and this was again present on 9/18. As such a hematuria work up was warranted. He presents today for cystoscopy.    Pt ID verified with patient: Yes     Procedure verified with patient: Yes     Procedure confirmed with physician and support staff: Yes     Consent form confirmed with physician and support staff.    Sign In  History and Physical Exam reviewed.  Informed Consent Discussed: Yes   Sign in Communication: Yes   Time Out:  Team Confirms the Correct Patient, Correct Procedure, Correct Site and Site Marking, Correct Position (if applicable).    Affirmation of Time Out: Yes   Sign Out:  Sign Out Discussion: Yes   Physician: Dre Lambert MD    The benefits, risks, alternatives of the cystoscopy procedure and personnel were discussed with the patient. The verbal consent was obtained and the patient agrees to proceed.    Description of procedure:   After fully informed, voluntary consent was obtained, the patient was brought into the procedure room, identified and placed in a supine position on the cystoscopy table.  The groin/scrotum were prepped with betadine and draped in a sterile fashion.  Urojet lidocaine gel was introduced.  A 15F flexible cystoscope was inserted into the urethra, and the bladder and urethra wereexamined in a systematic manner.  The patient tolerated the procedure well and there were no complications.      Cystoscopic findings:  The urethra was normal without strictures.  The prostate was 1.5cm long and demonstrated no bilobar hypertrophy.  There was no median lobe.  The external sphincter coapted normally and the bladder neck was normal. The bladder was  entered and careful pan endoscopy was carried out. The posterior,  superior and lateral walls and dome of the bladder were all well visualized and the scope was retroflexed upon itself..  There was no trabeculation.  There were no neoplasms, stones, or diverticula identifed.  The ureteric orifices were normal in position and number and effluxing clear urine.    CT Urogram 9/21:  Images reviewed    FINDINGS:     Urinary tract: No renal stones or hydronephrosis bilaterally. No  suspicious renal lesions. No filling defects within bilateral  pyelocalyceal system and ureters. Unremarkable urinary bladder. Tiny  pelvic phleboliths. Symmetric seminal vesicles. The prostate is not  enlarged.     Remainder of the abdomen and pelvis: Liver, gallbladder, pancreas,  spleen an adrenal glands are unremarkable. Decompressed stomach. No  dilated loops of bowel. No bowel wall thickening. The appendix is  unremarkable. No inguinal lymphadenopathy. No worrisome pelvic lymph  nodes. Subcentimeter retroperitoneal and mesenteric lymph nodes, not  enlarged by size criteria. No abdominal aortic aneurysm.     Lung bases:  Mild bilateral lower lobes dependent atelectasis. No  pleural effusions.     Bones and soft tissues: No aggressive bone lesions. Scattered  Schmorl's nodes.         IMPRESSION: Unremarkable CT urogram with no imaging findings to  explain the patient's microscopic hematuria. No renal stones,  hydronephrosis, suspicious renal lesions. No filling defects within  bilateral pyelocalyceal system and ureters. Unremarkable urinary  bladder.    Assessment/Plan:   Keesha Norton is a 33 year old male with a history of persistent microscopic hematuria.     Urine cytology - negative for malignant cells  CT Urogram - negative for kidney or upper tract abnormalities  Cystoscopy - normal with no evidence of abnormality     Normal hematuria work up  This would not need to be repeated for at least 3 years if microscopic hematuria or gross hematuria persist or develop.   F/U PRN    Dre Lambert MD

## 2018-10-24 ENCOUNTER — SURGERY (OUTPATIENT)
Age: 33
End: 2018-10-24
Payer: COMMERCIAL

## 2018-10-24 ENCOUNTER — HOSPITAL ENCOUNTER (OUTPATIENT)
Facility: AMBULATORY SURGERY CENTER | Age: 33
Discharge: HOME | End: 2018-10-24
Attending: INTERNAL MEDICINE | Admitting: INTERNAL MEDICINE
Payer: COMMERCIAL

## 2018-10-24 VITALS
OXYGEN SATURATION: 99 % | DIASTOLIC BLOOD PRESSURE: 70 MMHG | SYSTOLIC BLOOD PRESSURE: 117 MMHG | TEMPERATURE: 98.5 F | RESPIRATION RATE: 18 BRPM

## 2018-10-24 LAB — FLEXIBLE SIGMOIDOSCOPY: NORMAL

## 2018-10-24 PROCEDURE — 45331 SIGMOIDOSCOPY AND BIOPSY: CPT

## 2018-10-24 PROCEDURE — G8907 PT DOC NO EVENTS ON DISCHARG: HCPCS

## 2018-10-24 PROCEDURE — G8918 PT W/O PREOP ORDER IV AB PRO: HCPCS

## 2018-10-24 PROCEDURE — 88305 TISSUE EXAM BY PATHOLOGIST: CPT | Performed by: INTERNAL MEDICINE

## 2018-10-24 PROCEDURE — 45331 SIGMOIDOSCOPY AND BIOPSY: CPT | Performed by: INTERNAL MEDICINE

## 2018-10-24 RX ORDER — ONDANSETRON 2 MG/ML
4 INJECTION INTRAMUSCULAR; INTRAVENOUS
Status: DISCONTINUED | OUTPATIENT
Start: 2018-10-24 | End: 2018-10-25 | Stop reason: HOSPADM

## 2018-10-24 RX ORDER — LIDOCAINE 40 MG/G
CREAM TOPICAL
Status: DISCONTINUED | OUTPATIENT
Start: 2018-10-24 | End: 2018-10-25 | Stop reason: HOSPADM

## 2018-10-26 LAB — COPATH REPORT: NORMAL

## 2019-06-11 ENCOUNTER — OFFICE VISIT (OUTPATIENT)
Dept: PEDIATRICS | Facility: CLINIC | Age: 34
End: 2019-06-11
Payer: COMMERCIAL

## 2019-06-11 ENCOUNTER — ANCILLARY PROCEDURE (OUTPATIENT)
Dept: GENERAL RADIOLOGY | Facility: CLINIC | Age: 34
End: 2019-06-11
Attending: NURSE PRACTITIONER
Payer: COMMERCIAL

## 2019-06-11 VITALS
BODY MASS INDEX: 22.27 KG/M2 | DIASTOLIC BLOOD PRESSURE: 70 MMHG | HEART RATE: 94 BPM | HEIGHT: 71 IN | SYSTOLIC BLOOD PRESSURE: 110 MMHG | OXYGEN SATURATION: 99 % | WEIGHT: 159.1 LBS | TEMPERATURE: 97.9 F

## 2019-06-11 DIAGNOSIS — M25.50 MULTIPLE JOINT PAIN: Primary | ICD-10-CM

## 2019-06-11 DIAGNOSIS — M25.50 MULTIPLE JOINT PAIN: ICD-10-CM

## 2019-06-11 DIAGNOSIS — L73.9 FOLLICULITIS: ICD-10-CM

## 2019-06-11 LAB
CRP SERPL-MCNC: <2.9 MG/L (ref 0–8)
ERYTHROCYTE [DISTWIDTH] IN BLOOD BY AUTOMATED COUNT: 12.1 % (ref 10–15)
ERYTHROCYTE [SEDIMENTATION RATE] IN BLOOD BY WESTERGREN METHOD: 5 MM/H (ref 0–15)
HCT VFR BLD AUTO: 49.5 % (ref 40–53)
HGB BLD-MCNC: 16.8 G/DL (ref 13.3–17.7)
MCH RBC QN AUTO: 30.1 PG (ref 26.5–33)
MCHC RBC AUTO-ENTMCNC: 33.9 G/DL (ref 31.5–36.5)
MCV RBC AUTO: 89 FL (ref 78–100)
PLATELET # BLD AUTO: 228 10E9/L (ref 150–450)
RBC # BLD AUTO: 5.58 10E12/L (ref 4.4–5.9)
WBC # BLD AUTO: 6.8 10E9/L (ref 4–11)

## 2019-06-11 PROCEDURE — 86038 ANTINUCLEAR ANTIBODIES: CPT | Performed by: NURSE PRACTITIONER

## 2019-06-11 PROCEDURE — 36415 COLL VENOUS BLD VENIPUNCTURE: CPT | Performed by: NURSE PRACTITIONER

## 2019-06-11 PROCEDURE — 86431 RHEUMATOID FACTOR QUANT: CPT | Performed by: NURSE PRACTITIONER

## 2019-06-11 PROCEDURE — 85027 COMPLETE CBC AUTOMATED: CPT | Performed by: NURSE PRACTITIONER

## 2019-06-11 PROCEDURE — 73130 X-RAY EXAM OF HAND: CPT | Mod: LT | Performed by: RADIOLOGY

## 2019-06-11 PROCEDURE — 85652 RBC SED RATE AUTOMATED: CPT | Performed by: NURSE PRACTITIONER

## 2019-06-11 PROCEDURE — 86140 C-REACTIVE PROTEIN: CPT | Performed by: NURSE PRACTITIONER

## 2019-06-11 PROCEDURE — 99214 OFFICE O/P EST MOD 30 MIN: CPT | Performed by: NURSE PRACTITIONER

## 2019-06-11 RX ORDER — DOXYCYCLINE HYCLATE 100 MG
100 TABLET ORAL 2 TIMES DAILY
Qty: 14 TABLET | Refills: 0 | Status: SHIPPED | OUTPATIENT
Start: 2019-06-11 | End: 2023-04-07

## 2019-06-11 ASSESSMENT — MIFFLIN-ST. JEOR: SCORE: 1675.86

## 2019-06-11 NOTE — RESULT ENCOUNTER NOTE
Eliot Norton,    Attached are your test results.  -Normal red blood cell (hgb) levels, normal white blood cell count and normal platelet levels.  Inflammatory markers so far are normal    Please contact us if you have any questions.    Lucretia Ellis, CNP

## 2019-06-11 NOTE — RESULT ENCOUNTER NOTE
Eliot Norton,    Attached are your test results.  Hand xrays are normal    Please contact us if you have any questions.    Lucretia Ellis, CNP

## 2019-06-11 NOTE — PATIENT INSTRUCTIONS
PLAN:   1.   Symptomatic therapy suggested: OTC saline nasal spray as needed and call prn if symptoms persist or worsen.  2.  Orders Placed This Encounter   Medications     doxycycline hyclate (VIBRA-TABS) 100 MG tablet     Sig: Take 1 tablet (100 mg) by mouth 2 times daily     Dispense:  14 tablet     Refill:  0     Orders Placed This Encounter   Procedures     XR Hand Bilateral G/E 3 Views     CBC with platelets     CRP inflammation     Erythrocyte sedimentation rate auto     Anti Nuclear Anabell IgG by IFA with Reflex     Rheumatoid factor       3. Patient needs to follow up in if no improvement,or sooner if worsening of symptoms or other symptoms develop.  Will follow up and/or notify patient of  results via My Chart to determine further need for followup

## 2019-06-11 NOTE — PROGRESS NOTES
Subjective     Keesha Norton is a 34 year old male who presents to clinic today for the following health issues:    HPI     Joint Pain    Onset: 3-4 months    Description:   Location: all of joints, worst on the left side  Character: Dull ache    Intensity: mild    Progression of Symptoms: same    Accompanying Signs & Symptoms:  Other symptoms: muscles spasms     History:   Previous similar pain: no       Precipitating factors:   Trauma or overuse: no     Alleviating factors:  Improved by: massage    Therapies Tried and outcome: massage  Has joint pain in his shoulder and his arms   Did note has some possible family history of rheumatoid arthritis   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       Rash  Onset:on and off 1 month, but most recent has been within the last week     Description:   Location: right side of the neck  Character: round, burning, red-patient notes starts off burning feeling, looks like a pimple  Itching (Pruritis): no     Progression of Symptoms:  same    Accompanying Signs & Symptoms:  Fever: no   Body aches or joint pain: YES  Sore throat symptoms: no   Recent cold symptoms: YES- seasonal allergies     History:   Previous similar rash: YES- on his lower legs a few years ago    Precipitating factors:   Exposure to similar rash: no   New exposures: counter act allergy medication   Recent travel: no     Alleviating factors:  none    Therapies Tried and outcome: none  A month ago had a pimple like spot on his right side of his neck   Left a little bump   Usually has a burning sensation but usually only a bump or two but not blister  Years ago had a spot on his leg they thought may have been a herpes like lesion       Patient Active Problem List   Diagnosis     CARDIOVASCULAR SCREENING; LDL GOAL LESS THAN 160     Vitamin D insufficiency     Elevated bilirubin     GERD (gastroesophageal reflux disease)     Gilbert's disease     Herpes zoster without complication     Right flank pain      History of gastric ulcer     Past Surgical History:   Procedure Laterality Date     ESOPHAGOSCOPY, GASTROSCOPY, DUODENOSCOPY (EGD), COMBINED N/A 2/13/2015    Procedure: COMBINED ESOPHAGOSCOPY, GASTROSCOPY, DUODENOSCOPY (EGD);  Surgeon: Jair Dickinson MD;  Location: [facility] OR     ESOPHAGOSCOPY, GASTROSCOPY, DUODENOSCOPY (EGD), COMBINED Left 2/13/2015    Procedure: COMBINED ESOPHAGOSCOPY, GASTROSCOPY, DUODENOSCOPY (EGD), BIOPSY SINGLE OR MULTIPLE;  Surgeon: Jair Dickinson MD;  Location: [facility]     ESOPHAGOSCOPY, GASTROSCOPY, DUODENOSCOPY (EGD), COMBINED N/A 4/6/2015    Procedure: COMBINED ESOPHAGOSCOPY, GASTROSCOPY, DUODENOSCOPY (EGD);  Surgeon: Arsen Bashir MD;  Location: [facility] OR     HC TOOTH EXTRACTION W/FORCEP       SIGMOIDOSCOPY FLEXIBLE N/A 10/24/2018    Procedure: Flex Sig;  Surgeon: Dank Crabtree MD;  Location: [facility] OR       Social History     Tobacco Use     Smoking status: Never Smoker     Smokeless tobacco: Never Used   Substance Use Topics     Alcohol use: Yes     Comment: 2-3 drinks a week     Family History   Problem Relation Age of Onset     Hypertension Mother      Prostate Cancer Maternal Grandfather      Coronary Artery Disease Maternal Grandfather      Cerebrovascular Disease Paternal Grandmother      Asthma Maternal Uncle      Diabetes Paternal Aunt      Cancer - colorectal No family hx of      Breast Cancer No family hx of      Thyroid Disease No family hx of      Cancer No family hx of      Glaucoma No family hx of      Macular Degeneration No family hx of      Hyperlipidemia No family hx of      Ovarian Cancer No family hx of      Thyroid Disease No family hx of          Current Outpatient Medications   Medication Sig Dispense Refill     meclizine (ANTIVERT) 25 MG tablet Take 1 tablet (25 mg) by mouth 3 times daily as needed for dizziness (Patient not taking: Reported on 9/10/2018) 60 tablet 0     Allergies   Allergen Reactions     Seasonal Allergies      BP  "Readings from Last 3 Encounters:   06/11/19 110/70   10/24/18 117/70   09/18/18 121/76    Wt Readings from Last 3 Encounters:   06/11/19 72.2 kg (159 lb 1.6 oz)   09/18/18 71.7 kg (158 lb)   09/18/18 71.7 kg (158 lb)           Reviewed and updated as needed this visit by Provider         Review of Systems   ROS COMP: CONSTITUTIONAL:NEGATIVE for fever, chills, change in weight  INTEGUMENTARY/SKIN: NEGATIVE for non-healing lesion  and POSITIVE for intermittent rash on his neck   ENT/MOUTH: POSITIVE for nasal congestion and postnasal drainage and NEGATIVE for epistaxis and fever  RESP:NEGATIVE for significant cough or SOB  CV: NEGATIVE for chest pain, palpitations or peripheral edema  GI: NEGATIVE for nausea, poor appetite, vomiting and weight loss  MUSCULOSKELETAL: POSITIVE  for arthralgias  and joint pain  and NEGATIVE for joint swelling  and joint warmth   NEURO: NEGATIVE for weakness, dizziness or paresthesias  ENDOCRINE: NEGATIVE for temperature intolerance, skin/hair changes      Objective    /70 (BP Location: Right arm, Patient Position: Sitting, Cuff Size: Adult Regular)   Pulse 94   Temp 97.9  F (36.6  C) (Temporal)   Ht 1.791 m (5' 10.5\")   Wt 72.2 kg (159 lb 1.6 oz)   SpO2 99%   BMI 22.51 kg/m    Body mass index is 22.51 kg/m .  Physical Exam   GENERAL APPEARANCE: alert, active and no distress  NECK: no adenopathy and thyroid normal to palpation  RESP: lungs clear to auscultation - no rales, rhonchi or wheezes  CV: regular rates and rhythm and no murmur, click or rub  MS: extremities normal- no gross deformities noted and peripheral pulses normal  extremities normal- no gross deformities noted, gait normal and normal muscle tone  gait is age appropriate without ataxia, range of motion normal in all extremities, exam of all extremities reveals no erythema, induration, or nodules and no evidence of joint effusion  SKIN: negatives: color normal, temperature normal, mobility and turgor " normal  Isolated  small red papules and pustules originating in the hair follicles scattered over the neck   NEURO: Normal strength and tone, mentation intact and speech normal  PSYCH: mentation appears normal and affect normal/bright    Diagnostic Test Results:  Results for orders placed or performed in visit on 06/11/19   CBC with platelets   Result Value Ref Range    WBC 6.8 4.0 - 11.0 10e9/L    RBC Count 5.58 4.4 - 5.9 10e12/L    Hemoglobin 16.8 13.3 - 17.7 g/dL    Hematocrit 49.5 40.0 - 53.0 %    MCV 89 78 - 100 fl    MCH 30.1 26.5 - 33.0 pg    MCHC 33.9 31.5 - 36.5 g/dL    RDW 12.1 10.0 - 15.0 %    Platelet Count 228 150 - 450 10e9/L   CRP inflammation   Result Value Ref Range    CRP Inflammation <2.9 0.0 - 8.0 mg/L   Erythrocyte sedimentation rate auto   Result Value Ref Range    Sed Rate 5 0 - 15 mm/h   Anti Nuclear Anabell IgG by IFA with Reflex   Result Value Ref Range    AIMEE interpretation Negative NEG^Negative   Rheumatoid factor   Result Value Ref Range    Rheumatoid Factor <20 <20 IU/mL           Assessment & Plan     Keesha was seen today for derm problem and arthritis.    Diagnoses and all orders for this visit:    Multiple joint pain  -     CBC with platelets  -     CRP inflammation  -     Erythrocyte sedimentation rate auto  -     Anti Nuclear Anabell IgG by IFA with Reflex  -     Rheumatoid factor  -     XR Hand Bilateral G/E 3 Views; Future    Folliculitis  -     doxycycline hyclate (VIBRA-TABS) 100 MG tablet; Take 1 tablet (100 mg) by mouth 2 times daily      See Patient Instructions  Patient Instructions     PLAN:   1.   Symptomatic therapy suggested: OTC saline nasal spray as needed and call prn if symptoms persist or worsen.  2.  Orders Placed This Encounter   Medications     doxycycline hyclate (VIBRA-TABS) 100 MG tablet     Sig: Take 1 tablet (100 mg) by mouth 2 times daily     Dispense:  14 tablet     Refill:  0     Orders Placed This Encounter   Procedures     XR Hand Bilateral G/E 3 Views      CBC with platelets     CRP inflammation     Erythrocyte sedimentation rate auto     Anti Nuclear Anabell IgG by IFA with Reflex     Rheumatoid factor       3. Patient needs to follow up in if no improvement,or sooner if worsening of symptoms or other symptoms develop.  Will follow up and/or notify patient of  results via My Chart to determine further need for followup      No follow-ups on file.    MORRIS Weir CNP  [facility]

## 2019-06-12 LAB
ANA SER QL IF: NEGATIVE
RHEUMATOID FACT SER NEPH-ACNC: <20 IU/ML (ref 0–20)

## 2019-06-12 NOTE — RESULT ENCOUNTER NOTE
Eliot Norton,    Attached are your test results.  Rheumatoid marker and lupus screen are negative    Please contact us if you have any questions.    Lucretia Ellis, CNP

## 2023-01-01 NOTE — NURSING NOTE
"Chief Complaint   Patient presents with     Derm Problem     bumps on the right side of neck x 1 week has been on and off     Arthritis     joint pain 3-4 months       Initial /70 (BP Location: Right arm, Patient Position: Sitting, Cuff Size: Adult Regular)   Pulse 94   Temp 97.9  F (36.6  C) (Temporal)   Ht 1.791 m (5' 10.5\")   Wt 72.2 kg (159 lb 1.6 oz)   SpO2 99%   BMI 22.51 kg/m   Estimated body mass index is 22.51 kg/m  as calculated from the following:    Height as of this encounter: 1.791 m (5' 10.5\").    Weight as of this encounter: 72.2 kg (159 lb 1.6 oz).  Medication Reconciliation: complete      DARIAN Britt      "
soft

## 2023-04-07 ENCOUNTER — OFFICE VISIT (OUTPATIENT)
Dept: FAMILY MEDICINE | Facility: CLINIC | Age: 38
End: 2023-04-07
Payer: COMMERCIAL

## 2023-04-07 VITALS
OXYGEN SATURATION: 99 % | HEIGHT: 71 IN | SYSTOLIC BLOOD PRESSURE: 117 MMHG | BODY MASS INDEX: 23.52 KG/M2 | HEART RATE: 75 BPM | DIASTOLIC BLOOD PRESSURE: 79 MMHG | WEIGHT: 168 LBS | TEMPERATURE: 96.9 F | RESPIRATION RATE: 16 BRPM

## 2023-04-07 DIAGNOSIS — M62.830 SPASM OF BACK MUSCLES: Primary | ICD-10-CM

## 2023-04-07 PROCEDURE — 99213 OFFICE O/P EST LOW 20 MIN: CPT | Performed by: PHYSICIAN ASSISTANT

## 2023-04-07 RX ORDER — METHOCARBAMOL 500 MG/1
500 TABLET, FILM COATED ORAL 3 TIMES DAILY PRN
Qty: 30 TABLET | Refills: 1 | Status: SHIPPED | OUTPATIENT
Start: 2023-04-07 | End: 2024-04-26

## 2023-04-07 ASSESSMENT — PAIN SCALES - GENERAL: PAINLEVEL: MILD PAIN (3)

## 2023-04-07 ASSESSMENT — ENCOUNTER SYMPTOMS: BACK PAIN: 1

## 2023-04-07 NOTE — PROGRESS NOTES
Assessment & Plan       ICD-10-CM    1. Spasm of back muscles  M62.830 methocarbamol (ROBAXIN) 500 MG tablet          Talk to patient about his concerns.  At this point I think this is more muscle spasm.  We will get him started on methocarbamol.  He can work on activity modification.  He can continue Tylenol and heat.  Warning signs were discussed.  Expected healing course was discussed.  If symptoms persist may benefit from physical therapy.    [facility]A [facility]   AJ is a 37 year old, presenting for the following health issues:  Back Pain        4/7/2023     8:00 AM   Additional Questions   Roomed by Francheska   Accompanied by Self         4/7/2023     8:00 AM   Patient Reported Additional Medications   Patient reports taking the following new medications no new meds     Back Pain     History of Present Illness       Back Pain:  He presents for follow up of back pain. Patient's back pain is a new problem.    Original cause of back pain: not sure  First noticed back pain: in the last week  Patient feels back pain: dailyLocation of back pain:  Right lower back and left lower back  Description of back pain: stabbing  Back pain spreads: left thigh    Since patient first noticed back pain, pain is: gradually improving  Does back pain interfere with his job:  Yes  On a scale of 1-10 (10 being the worst), patient describes pain as:  8  What makes back pain worse: bending, coughing, certain positions, standing and twisting  Acupuncture: not tried  Acetaminophen: helpful  Activity or exercise: not helpful  Chiropractor:  Not tried  Cold: not tried  Heat: helpful  Massage: helpful  Muscle relaxants: not tried  NSAIDS: not tried  Opioids: not tried  Physical Therapy: not tried  Rest: helpful  Steroid Injection: not tried  Stretching: not helpful  Surgery: not tried  TENS unit: not tried  Topical pain relievers: not tried  Other healthcare providers patient is seeing for  "back pain: None    He eats 4 or more servings of fruits and vegetables daily.He consumes 0 sweetened beverage(s) daily.He exercises with enough effort to increase his heart rate 30 to 60 minutes per day.  He exercises with enough effort to increase his heart rate 5 days per week.   He is taking medications regularly.   work up one morning with lower back pain. Feels more like muscle spasms. Increase with trunk range of motion.   Pain is off and on.   No known injuries. Does recall his 7 hr old daughter jumping on him and he caught her but no pain at that time.   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   No radiations of pain. No numbness/tingling .  Tx; heat helps.   No previous back problems.   No difficulties with bowl or bladder.     Review of Systems   Musculoskeletal: Positive for back pain.      Constitutional, HEENT, cardiovascular, pulmonary, gi and gu systems are negative, except as otherwise noted.      Objective    /79   Pulse 75   Temp 96.9  F (36.1  C) (Tympanic)   Resp 16   Ht 1.803 m (5' 11\")   Wt 76.2 kg (168 lb)   SpO2 99%   BMI 23.43 kg/m    Body mass index is 23.43 kg/m .  Physical Exam   GENERAL: healthy, alert and no distress  RESP: lungs clear to auscultation - no rales, rhonchi or wheezes  CV: regular rate and rhythm, normal S1 S2, no S3 or S4, no murmur, click or rub, no peripheral edema and peripheral pulses strong  ABDOMEN: soft, nontender, no hepatosplenomegaly, no masses and bowel sounds normal  MS: no gross musculoskeletal defects noted, no edema  No bony tenderness of his back.  He has mild tenderness over the paraspinal musculature bilaterally.  He has full range of motion of bilateral lower extremities with 5-5 strength.  He has tight hamstrings bilaterally but negative straight leg raise.  His calves are soft nontender is neuro vas intact distally.    "

## 2023-04-30 ENCOUNTER — HEALTH MAINTENANCE LETTER (OUTPATIENT)
Age: 38
End: 2023-04-30

## 2024-02-19 ENCOUNTER — TRANSFERRED RECORDS (OUTPATIENT)
Dept: HEALTH INFORMATION MANAGEMENT | Facility: CLINIC | Age: 39
End: 2024-02-19
Payer: COMMERCIAL

## 2024-02-22 ASSESSMENT — PATIENT HEALTH QUESTIONNAIRE - PHQ9
10. IF YOU CHECKED OFF ANY PROBLEMS, HOW DIFFICULT HAVE THESE PROBLEMS MADE IT FOR YOU TO DO YOUR WORK, TAKE CARE OF THINGS AT HOME, OR GET ALONG WITH OTHER PEOPLE: NOT DIFFICULT AT ALL
SUM OF ALL RESPONSES TO PHQ QUESTIONS 1-9: 3
SUM OF ALL RESPONSES TO PHQ QUESTIONS 1-9: 3

## 2024-02-23 ENCOUNTER — OFFICE VISIT (OUTPATIENT)
Dept: FAMILY MEDICINE | Facility: CLINIC | Age: 39
End: 2024-02-23
Payer: COMMERCIAL

## 2024-02-23 VITALS
DIASTOLIC BLOOD PRESSURE: 80 MMHG | TEMPERATURE: 97.6 F | RESPIRATION RATE: 16 BRPM | BODY MASS INDEX: 24.68 KG/M2 | SYSTOLIC BLOOD PRESSURE: 134 MMHG | WEIGHT: 172.4 LBS | HEART RATE: 73 BPM | OXYGEN SATURATION: 99 % | HEIGHT: 70 IN

## 2024-02-23 DIAGNOSIS — J32.9 SINUSITIS, UNSPECIFIED CHRONICITY, UNSPECIFIED LOCATION: ICD-10-CM

## 2024-02-23 DIAGNOSIS — J02.9 SORE THROAT: Primary | ICD-10-CM

## 2024-02-23 LAB
DEPRECATED S PYO AG THROAT QL EIA: NEGATIVE
GROUP A STREP BY PCR: NOT DETECTED

## 2024-02-23 PROCEDURE — 87651 STREP A DNA AMP PROBE: CPT | Performed by: PHYSICIAN ASSISTANT

## 2024-02-23 PROCEDURE — 99213 OFFICE O/P EST LOW 20 MIN: CPT | Performed by: PHYSICIAN ASSISTANT

## 2024-02-23 RX ORDER — NAPROXEN 500 MG/1
500 TABLET ORAL 2 TIMES DAILY WITH MEALS
COMMUNITY
Start: 2024-02-18

## 2024-02-23 ASSESSMENT — PAIN SCALES - GENERAL: PAINLEVEL: NO PAIN (0)

## 2024-02-23 NOTE — PROGRESS NOTES
"  Assessment & Plan     (J02.9) Sore throat  (primary encounter diagnosis)  Comment: Sore throat.  Rapid strep positive.  Strep PCR in process.  Very faint erythema to the posterior oropharynx.  Plan: Streptococcus A Rapid Screen w/Reflex to PCR -         Clinic Collect, Group A Streptococcus PCR         Throat Swab          (J32.9) Sinusitis, unspecified chronicity, unspecified location  Comment: Ongoing sinus pressure.  Discussed with patient given longevity of symptoms cover with Augmentin.  Discussed symptomatic cares and nasal sprays.  Signs and symptoms to watch for and reviewed with patient.  He will return with any worsening new concerns.6  Plan: amoxicillin-clavulanate (AUGMENTIN) 875-125 MG         tablet                 Subjective   CHRISTIAN is a 38 year old, presenting for the following health issues:  Hospital F/U (Follow Up from ER visit at [facility] for Headache. )    HPI   3 weeks ago patient ill with URI symptoms and sinus pressure.  Was having some muscle aches and tension at that time.  This resulted in a headache over the posterior occiput.  [facility] and with a systolic pressure of 150 he was advised to seek emergency department evaluation.  He has had waxing and waning symptoms of the headache.  Normal headache today.  Denies any numbness or tingling.  States had a similar episode 5 years ago with unremarkable workup including MRIs.  Without related distress at that time.  Continues to have some sinus pressure.  Has been experiencing more ear pain as well.  Feels pressure-like sensation.  Pain with swallowing.  He initially had a sore throat that continues to worsen.  His daughter was diagnosed with strep throat.           Review of Systems  Constitutional, HEENT, cardiovascular, pulmonary, gi and gu systems are negative, except as otherwise noted.      Objective    /80   Pulse 73   Temp 97.6  F (36.4  C) (Tympanic)   Resp 16   Ht 1.778 m (5' 10\")   Wt 78.2 kg " (172 lb 6.4 oz)   SpO2 99%   BMI 24.74 kg/m    Body mass index is 24.74 kg/m .  Physical Exam   GENERAL: alert and no distress  EYES: Eyes grossly normal to inspection, PERRL and conjunctivae and sclerae normal  HENT: normal cephalic/atraumatic, both ears: Mild air-fluid levels, nose and mouth without ulcers or lesions, oropharynx clear, and oral mucous membranes moist  RESP: lungs clear to auscultation - no rales, rhonchi or wheezes  CV: regular rate and rhythm, normal S1 S2, no S3 or S4, no murmur, click or rub, no peripheral edema  ABDOMEN: soft, nontender, no hepatosplenomegaly, no masses and bowel sounds normal  MS: no gross musculoskeletal defects noted, no edema  NEURO: Normal strength and tone, sensory exam grossly normal, mentation intact, cranial nerves 2-12 intact, and DTR's normal and symmetric     Results for orders placed or performed in visit on 02/23/24   Streptococcus A Rapid Screen w/Reflex to PCR - Clinic Collect     Status: Normal    Specimen: Throat; Swab   Result Value Ref Range    Group A Strep antigen Negative Negative           Signed Electronically by: Zay Howell PA-C    Answers submitted by the patient for this visit:  Patient Health Questionnaire (Submitted on 2/22/2024)  If you checked off any problems, how difficult have these problems made it for you to do your work, take care of things at home, or get along with other people?: Not difficult at all  PHQ9 TOTAL SCORE: 3

## 2024-03-04 ENCOUNTER — OFFICE VISIT (OUTPATIENT)
Dept: FAMILY MEDICINE | Facility: CLINIC | Age: 39
End: 2024-03-04
Payer: COMMERCIAL

## 2024-03-04 VITALS
WEIGHT: 171.4 LBS | HEART RATE: 71 BPM | SYSTOLIC BLOOD PRESSURE: 125 MMHG | HEIGHT: 70 IN | BODY MASS INDEX: 24.54 KG/M2 | TEMPERATURE: 98 F | DIASTOLIC BLOOD PRESSURE: 81 MMHG | OXYGEN SATURATION: 100 % | RESPIRATION RATE: 16 BRPM

## 2024-03-04 DIAGNOSIS — G44.209 TENSION HEADACHE: Primary | ICD-10-CM

## 2024-03-04 DIAGNOSIS — J02.9 SORE THROAT: ICD-10-CM

## 2024-03-04 LAB
BASOPHILS # BLD AUTO: 0 10E3/UL (ref 0–0.2)
BASOPHILS NFR BLD AUTO: 0 %
EOSINOPHIL # BLD AUTO: 0.1 10E3/UL (ref 0–0.7)
EOSINOPHIL NFR BLD AUTO: 2 %
ERYTHROCYTE [DISTWIDTH] IN BLOOD BY AUTOMATED COUNT: 11.6 % (ref 10–15)
HCT VFR BLD AUTO: 50 % (ref 40–53)
HGB BLD-MCNC: 17.5 G/DL (ref 13.3–17.7)
IMM GRANULOCYTES # BLD: 0 10E3/UL
IMM GRANULOCYTES NFR BLD: 0 %
LYMPHOCYTES # BLD AUTO: 2 10E3/UL (ref 0.8–5.3)
LYMPHOCYTES NFR BLD AUTO: 30 %
MCH RBC QN AUTO: 30.4 PG (ref 26.5–33)
MCHC RBC AUTO-ENTMCNC: 35 G/DL (ref 31.5–36.5)
MCV RBC AUTO: 87 FL (ref 78–100)
MONOCYTES # BLD AUTO: 0.5 10E3/UL (ref 0–1.3)
MONOCYTES NFR BLD AUTO: 8 %
MONOCYTES NFR BLD AUTO: NEGATIVE %
NEUTROPHILS # BLD AUTO: 4 10E3/UL (ref 1.6–8.3)
NEUTROPHILS NFR BLD AUTO: 60 %
PLATELET # BLD AUTO: 244 10E3/UL (ref 150–450)
RBC # BLD AUTO: 5.76 10E6/UL (ref 4.4–5.9)
WBC # BLD AUTO: 6.6 10E3/UL (ref 4–11)

## 2024-03-04 PROCEDURE — 86140 C-REACTIVE PROTEIN: CPT | Performed by: PHYSICIAN ASSISTANT

## 2024-03-04 PROCEDURE — 99213 OFFICE O/P EST LOW 20 MIN: CPT | Performed by: PHYSICIAN ASSISTANT

## 2024-03-04 PROCEDURE — 85025 COMPLETE CBC W/AUTO DIFF WBC: CPT | Performed by: PHYSICIAN ASSISTANT

## 2024-03-04 PROCEDURE — 36415 COLL VENOUS BLD VENIPUNCTURE: CPT | Performed by: PHYSICIAN ASSISTANT

## 2024-03-04 PROCEDURE — 86308 HETEROPHILE ANTIBODY SCREEN: CPT | Performed by: PHYSICIAN ASSISTANT

## 2024-03-04 PROCEDURE — 80048 BASIC METABOLIC PNL TOTAL CA: CPT | Performed by: PHYSICIAN ASSISTANT

## 2024-03-04 ASSESSMENT — ENCOUNTER SYMPTOMS: HEADACHES: 1

## 2024-03-04 ASSESSMENT — PAIN SCALES - GENERAL: PAINLEVEL: MODERATE PAIN (5)

## 2024-03-04 NOTE — PROGRESS NOTES
Assessment & Plan     (G44.209) Tension headache  (primary encounter diagnosis)  Comment: Patient with tension headache symptoms.  Has been ongoing for the better part of the month.  Waxes and wanes in severity.  Patient does grind teeth at night has mouthguard.  Some mild tenderness over the left TMJ possibility of TMJ syndrome contributing to symptoms.  Patient is concerned of possible carotid artery issue causing his symptoms as he had a neighbor with similar problems.  Discussed with patient obtaining ultrasound.  There is no carotid bruits on examination.  No focal neurologic deficits.  Had a prior CT scan in 2/27/2024 that was unremarkable.  This does appear most consistent with a tension headache pattern.  Discussed signs and symptoms to watch for.  Plan: US Carotid Bilateral, CRP, inflammation        (J02.9) Sore throat  Comment: Ongoing sore throat.  Does have history of seasonal allergies.  Mild air fluid behind bilateral TMs.  Has not yet started taking oral medications for his seasonal allergies.  Discussed with patient using Flonase nasal spray as well as restarting his normal medication regimen.  Patient was concerned of mono.  There is no left upper quadrant abdominal tenderness or splenomegaly.  Monotest returned negative.  CBC without any elevation.  Very faint anterior lymphadenopathy.  Discussed with patient should he not have improvement of symptoms with his regular medications possibility of ENT referral.  No evidence of peritonsillar abscess or other more concerning findings  Plan: Mononucleosis screen, CBC with platelets and         differential, Basic metabolic panel  (Ca, Cl,         CO2, Creat, Gluc, K, Na, BUN), Adult ENT          Referral, CRP, inflammation           Subjective   AJ is a 38 year old, presenting for the following health issues:  Follow Up and Headache (Pt said that he will like to check for Mono.)    History of Present Illness       Headaches:   Since the  "patient's last clinic visit, headaches are: no change  The patient is getting headaches:  Daily. Also hurts behind the eyes. Can also cause dizziness.  He is not able to do normal daily activities when he has a migraine.  The patient is taking the following rescue/relief medications:  Ibuprofen (Advil, Motrin), Naproxyn (Aleve), Tylenol and other   Patient states \"I get some relief\" from the rescue/relief medications.   The patient is taking the following medications to prevent migraines:  No medications to prevent migraines  In the past 4 weeks, the patient has gone to an Urgent Care or Emergency Room 2 times times due to headaches.    He eats 4 or more servings of fruits and vegetables daily.He consumes 0 sweetened beverage(s) daily.He exercises with enough effort to increase his heart rate 10 to 19 minutes per day.  He exercises with enough effort to increase his heart rate 3 or less days per week.   He is taking medications regularly.     Patient continues to have waxing and waning headaches over the last month or so.   discomfort predominantly over the left side of the base of the skull as well as over to the temporal region.  Patient does report grinding his teeth and does use a mouthguard.  Continues to have sore throat and is concerned of potential mono.  Patient had completed course of antibiotics of Augmentin for sinus infection.  Was continued to have headache symptoms and was seen in the emergency department on 2/27/2024.  CT head was obtained at that time.  No concerning findings aside from some mild scattered mucosal thickening in the paranasal sinuses.  Patient does work in construction.  There is no new trauma or injury associated with this.  No numbness or tingling rating down the arms.  No recent nausea, vomiting, diarrhea.  Extensive history of seasonal allergies but is not taking any oral medications at this time for allergies.  Intermittent Flonase use.              Review of " "Systems  Constitutional, HEENT, cardiovascular, pulmonary, gi and gu systems are negative, except as otherwise noted.      Objective    /81   Pulse 71   Temp 98  F (36.7  C) (Tympanic)   Resp 16   Ht 1.778 m (5' 10\")   Wt 77.7 kg (171 lb 6.4 oz)   SpO2 100%   BMI 24.59 kg/m    Body mass index is 24.59 kg/m .  Physical Exam   GENERAL: alert and no distress  EYES: Eyes grossly normal to inspection, PERRL and conjunctivae and sclerae normal  HENT: normal cephalic/atraumatic, both ears: mild air fluid level without erythema or bulging. , nose and mouth without ulcers or lesions, oropharynx clear, and oral mucous membranes moist  NECK: faint anterior lymphadenopathy, no asymmetry, masses, or scars  RESP: lungs clear to auscultation - no rales, rhonchi or wheezes  CV: regular rate and rhythm, normal S1 S2, no S3 or S4, no murmur, click or rub, no peripheral edema  ABDOMEN: soft, nontender, no hepatosplenomegaly, no masses and bowel sounds normal  MS: Tenderness at the left skull base at insertion point of trapezius musculature.  Intact median, ulnar, radial nerve function.  No tenderness along the cervical spinous process.  Mild tenderness over the left TMJ.  NEURO: Normal strength and tone, sensory exam grossly normal, mentation intact, cranial nerves 2-12 intact, and normal finger-nose-finger.  Normal heel shin.        Results for orders placed or performed in visit on 03/04/24   Mononucleosis screen     Status: Normal   Result Value Ref Range    Mononucleosis Screen Negative Negative   CBC with platelets and differential     Status: None   Result Value Ref Range    WBC Count 6.6 4.0 - 11.0 10e3/uL    RBC Count 5.76 4.40 - 5.90 10e6/uL    Hemoglobin 17.5 13.3 - 17.7 g/dL    Hematocrit 50.0 40.0 - 53.0 %    MCV 87 78 - 100 fL    MCH 30.4 26.5 - 33.0 pg    MCHC 35.0 31.5 - 36.5 g/dL    RDW 11.6 10.0 - 15.0 %    Platelet Count 244 150 - 450 10e3/uL    % Neutrophils 60 %    % Lymphocytes 30 %    % Monocytes 8 % "    % Eosinophils 2 %    % Basophils 0 %    % Immature Granulocytes 0 %    Absolute Neutrophils 4.0 1.6 - 8.3 10e3/uL    Absolute Lymphocytes 2.0 0.8 - 5.3 10e3/uL    Absolute Monocytes 0.5 0.0 - 1.3 10e3/uL    Absolute Eosinophils 0.1 0.0 - 0.7 10e3/uL    Absolute Basophils 0.0 0.0 - 0.2 10e3/uL    Absolute Immature Granulocytes 0.0 <=0.4 10e3/uL   CBC with platelets and differential     Status: None    Narrative    The following orders were created for panel order CBC with platelets and differential.  Procedure                               Abnormality         Status                     ---------                               -----------         ------                     CBC with platelets and d...[[id number]]                      Final result                 Please view results for these tests on the individual orders.           Signed Electronically by: Zay Howell PA-C

## 2024-03-05 LAB
ANION GAP SERPL CALCULATED.3IONS-SCNC: 9 MMOL/L (ref 7–15)
BUN SERPL-MCNC: 16.9 MG/DL (ref 6–20)
CALCIUM SERPL-MCNC: 9.7 MG/DL (ref 8.6–10)
CHLORIDE SERPL-SCNC: 102 MMOL/L (ref 98–107)
CREAT SERPL-MCNC: 0.93 MG/DL (ref 0.67–1.17)
CRP SERPL-MCNC: <3 MG/L
DEPRECATED HCO3 PLAS-SCNC: 28 MMOL/L (ref 22–29)
EGFRCR SERPLBLD CKD-EPI 2021: >90 ML/MIN/1.73M2
GLUCOSE SERPL-MCNC: 104 MG/DL (ref 70–99)
POTASSIUM SERPL-SCNC: 4.6 MMOL/L (ref 3.4–5.3)
SODIUM SERPL-SCNC: 139 MMOL/L (ref 135–145)

## 2024-03-08 ENCOUNTER — ANCILLARY PROCEDURE (OUTPATIENT)
Dept: ULTRASOUND IMAGING | Facility: CLINIC | Age: 39
End: 2024-03-08
Attending: PHYSICIAN ASSISTANT
Payer: COMMERCIAL

## 2024-03-08 DIAGNOSIS — G44.209 TENSION HEADACHE: ICD-10-CM

## 2024-03-08 PROCEDURE — 93880 EXTRACRANIAL BILAT STUDY: CPT | Mod: TC | Performed by: RADIOLOGY

## 2024-04-26 ENCOUNTER — OFFICE VISIT (OUTPATIENT)
Dept: FAMILY MEDICINE | Facility: CLINIC | Age: 39
End: 2024-04-26
Payer: COMMERCIAL

## 2024-04-26 ENCOUNTER — ANCILLARY PROCEDURE (OUTPATIENT)
Dept: GENERAL RADIOLOGY | Facility: CLINIC | Age: 39
End: 2024-04-26
Attending: PHYSICIAN ASSISTANT
Payer: COMMERCIAL

## 2024-04-26 VITALS
HEART RATE: 82 BPM | RESPIRATION RATE: 18 BRPM | SYSTOLIC BLOOD PRESSURE: 119 MMHG | DIASTOLIC BLOOD PRESSURE: 78 MMHG | WEIGHT: 178 LBS | HEIGHT: 70 IN | BODY MASS INDEX: 25.48 KG/M2 | OXYGEN SATURATION: 100 %

## 2024-04-26 DIAGNOSIS — M62.838 NECK MUSCLE SPASM: ICD-10-CM

## 2024-04-26 DIAGNOSIS — M62.838 NECK MUSCLE SPASM: Primary | ICD-10-CM

## 2024-04-26 DIAGNOSIS — G44.209 TENSION HEADACHE: ICD-10-CM

## 2024-04-26 PROCEDURE — 72040 X-RAY EXAM NECK SPINE 2-3 VW: CPT | Mod: TC | Performed by: RADIOLOGY

## 2024-04-26 PROCEDURE — 99213 OFFICE O/P EST LOW 20 MIN: CPT | Performed by: PHYSICIAN ASSISTANT

## 2024-04-26 RX ORDER — TIZANIDINE 2 MG/1
2 TABLET ORAL
Qty: 20 TABLET | Refills: 0 | Status: SHIPPED | OUTPATIENT
Start: 2024-04-26

## 2024-04-26 ASSESSMENT — PAIN SCALES - GENERAL: PAINLEVEL: WORST PAIN (10)

## 2024-04-26 NOTE — PROGRESS NOTES
"  Assessment & Plan     (M62.838) Neck muscle spasm  (primary encounter diagnosis)  Comment: Patient presents for evaluation of neck spasm/trapezius spasm.  Discussed with patient x-ray of the cervical spine.  No prior imaging.  No direct trauma or injury to the neck.  He has reproducible tenderness over the musculature.  Suspicion for muscle spasm that has been contributing to symptoms.  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  No focal neurologic deficit on examination.  Cranial nerves II through XII grossly intact.  Patient also has discomfort over the lateral epicondyles and this may also be exacerbating symptoms.  Discussed with patient muscle relaxer as well as physical therapy referral.  Discussed no driving or operating heavy machinery under the influence of medications.  Plan: Physical Therapy  Referral, XR         Cervical Spine 2/3 Views, tiZANidine (ZANAFLEX)        2 MG tablet           (G44.209) Tension headache  Comment: History of tension headache.  Patient will continue monitor symptoms.  I suspect neck muscle spasm contributing.  No concerning neurologic findings on examination.  Follow-up with physical therapy.  Headaches overall have been improving  Plan: tiZANidine (ZANAFLEX) 2 MG tablet     BMI  Estimated body mass index is 25.54 kg/m  as calculated from the following:    Height as of this encounter: 1.778 m (5' 10\").    Weight as of this encounter: 80.7 kg (178 lb).       Subjective   AJ is a 38 year old, presenting for the following health issues:  Neck Pain      4/26/2024     9:11 AM   Additional Questions   Roomed by DM     History of Present Illness       Reason for visit:  Neck pain causing numbness and tingliness  Symptom onset:  3-4 weeks ago  Symptom intensity:  Moderate  Symptom progression:  Staying the same  Had these symptoms before:  No  What makes it worse:  Sneezing, coughing    He eats 2-3 servings of fruits and vegetables daily.He " "consumes 0 sweetened beverage(s) daily.He exercises with enough effort to increase his heart rate 10 to 19 minutes per day.  He exercises with enough effort to increase his heart rate 3 or less days per week.   He is taking medications regularly.     Patient works in construction.  Patient is right-hand dominant.  He has been having ongoing issues of neck pain worse on the left side.  Previously was having tension headaches that have decreased in severity.  Indicates discomfort over the left cervical paraspinal musculature at the base of the skull.  No direct trauma or injury to this site.  He has not had any loss of bowel or bladder control.  Intermittent altered sensation of the left upper extremity, however, also indicates discomfort over the lateral epicondyle.  with this has had discomfort in the musculature for the extensors on the left side.  No fevers.  No recent URI symptoms.  Patient does have history of seasonal allergies that been more prominent as of late      Review of Systems  Constitutional, neuro, ENT, endocrine, pulmonary, cardiac, gastrointestinal, genitourinary, musculoskeletal, integument and psychiatric systems are negative, except as otherwise noted.      Objective    /78   Pulse 82   Resp 18   Ht 1.778 m (5' 10\")   Wt 80.7 kg (178 lb)   SpO2 100%   BMI 25.54 kg/m    Body mass index is 25.54 kg/m .  Physical Exam   GENERAL: alert and no distress  RESP: lungs clear to auscultation - no rales, rhonchi or wheezes  CV: regular rate and rhythm, normal S1 S2, no S3 or S4, no murmur, click or rub, no peripheral edema  MS: Tenderness over the trapezius musculature and left cervical paraspinal musculature.  No midline tenderness.  Tenderness over the lateral epicondyles.  No obvious bony deformities.  No crepitus.  NEURO: Normal strength and tone, sensory exam grossly normal, mentation intact, cranial nerves 2-12 intact, patellar reflex 2+.  Intact median, ulnar, radial nerve function.  "  strength 5/5 and symmetric.            Signed Electronically by: Zay Howell PA-C

## 2024-07-07 ENCOUNTER — HEALTH MAINTENANCE LETTER (OUTPATIENT)
Age: 39
End: 2024-07-07

## 2025-01-28 ENCOUNTER — OFFICE VISIT (OUTPATIENT)
Dept: FAMILY MEDICINE | Facility: OTHER | Age: 40
End: 2025-01-28
Payer: COMMERCIAL

## 2025-01-28 VITALS
SYSTOLIC BLOOD PRESSURE: 118 MMHG | HEART RATE: 76 BPM | HEIGHT: 70 IN | WEIGHT: 172 LBS | TEMPERATURE: 97.2 F | OXYGEN SATURATION: 97 % | BODY MASS INDEX: 24.62 KG/M2 | RESPIRATION RATE: 16 BRPM | DIASTOLIC BLOOD PRESSURE: 70 MMHG

## 2025-01-28 DIAGNOSIS — R59.0 POSTAURICULAR LYMPHADENOPATHY: Primary | ICD-10-CM

## 2025-01-28 DIAGNOSIS — G44.209 TENSION HEADACHE: ICD-10-CM

## 2025-01-28 LAB
BASOPHILS # BLD AUTO: 0 10E3/UL (ref 0–0.2)
BASOPHILS NFR BLD AUTO: 0 %
CRP SERPL-MCNC: <3 MG/L
EOSINOPHIL # BLD AUTO: 0.2 10E3/UL (ref 0–0.7)
EOSINOPHIL NFR BLD AUTO: 3 %
ERYTHROCYTE [DISTWIDTH] IN BLOOD BY AUTOMATED COUNT: 11.8 % (ref 10–15)
ERYTHROCYTE [SEDIMENTATION RATE] IN BLOOD BY WESTERGREN METHOD: 5 MM/HR (ref 0–15)
HCT VFR BLD AUTO: 47.9 % (ref 40–53)
HGB BLD-MCNC: 16.9 G/DL (ref 13.3–17.7)
IMM GRANULOCYTES # BLD: 0 10E3/UL
IMM GRANULOCYTES NFR BLD: 0 %
LYMPHOCYTES # BLD AUTO: 1.6 10E3/UL (ref 0.8–5.3)
LYMPHOCYTES NFR BLD AUTO: 28 %
MCH RBC QN AUTO: 30.6 PG (ref 26.5–33)
MCHC RBC AUTO-ENTMCNC: 35.3 G/DL (ref 31.5–36.5)
MCV RBC AUTO: 87 FL (ref 78–100)
MONOCYTES # BLD AUTO: 0.5 10E3/UL (ref 0–1.3)
MONOCYTES NFR BLD AUTO: 9 %
NEUTROPHILS # BLD AUTO: 3.4 10E3/UL (ref 1.6–8.3)
NEUTROPHILS NFR BLD AUTO: 59 %
PLATELET # BLD AUTO: 214 10E3/UL (ref 150–450)
RBC # BLD AUTO: 5.52 10E6/UL (ref 4.4–5.9)
WBC # BLD AUTO: 5.7 10E3/UL (ref 4–11)

## 2025-01-28 PROCEDURE — 36415 COLL VENOUS BLD VENIPUNCTURE: CPT

## 2025-01-28 PROCEDURE — 99214 OFFICE O/P EST MOD 30 MIN: CPT

## 2025-01-28 PROCEDURE — 85652 RBC SED RATE AUTOMATED: CPT

## 2025-01-28 PROCEDURE — 86140 C-REACTIVE PROTEIN: CPT

## 2025-01-28 PROCEDURE — 85025 COMPLETE CBC W/AUTO DIFF WBC: CPT

## 2025-01-28 ASSESSMENT — PAIN SCALES - GENERAL: PAINLEVEL_OUTOF10: MILD PAIN (3)

## 2025-01-28 NOTE — PATIENT INSTRUCTIONS
You do have swelling to the lymph node behind your right ear. I suspect the swelling on the back of the neck is the occipital lymph node swollen. It is reassuring that the lymph nodes are soft, mobile, and non-tender. Often times lymph node swelling is reactionary to localized infection (viral or bacterial). You have had a very good work up thus far with labs and imaging to further evaluate the lymph node swelling. Today we are going to update some labs to ensure white blood cells and inflammatory markers are stable. I have also ordered a soft tissue ultrasound to further evaluate the swollen lymph nodes. Please call [phone] to schedule your imaging study.

## 2025-01-29 ENCOUNTER — ANCILLARY PROCEDURE (OUTPATIENT)
Dept: ULTRASOUND IMAGING | Facility: CLINIC | Age: 40
End: 2025-01-29
Payer: COMMERCIAL

## 2025-01-29 DIAGNOSIS — R59.0 POSTAURICULAR LYMPHADENOPATHY: ICD-10-CM

## 2025-01-29 PROCEDURE — 76536 US EXAM OF HEAD AND NECK: CPT | Performed by: RADIOLOGY

## 2025-06-25 ENCOUNTER — OFFICE VISIT (OUTPATIENT)
Dept: FAMILY MEDICINE | Facility: CLINIC | Age: 40
End: 2025-06-25
Payer: COMMERCIAL

## 2025-06-25 ENCOUNTER — RESULTS FOLLOW-UP (OUTPATIENT)
Dept: FAMILY MEDICINE | Facility: CLINIC | Age: 40
End: 2025-06-25

## 2025-06-25 ENCOUNTER — ANCILLARY PROCEDURE (OUTPATIENT)
Dept: GENERAL RADIOLOGY | Facility: CLINIC | Age: 40
End: 2025-06-25
Attending: PHYSICIAN ASSISTANT
Payer: COMMERCIAL

## 2025-06-25 VITALS
SYSTOLIC BLOOD PRESSURE: 122 MMHG | DIASTOLIC BLOOD PRESSURE: 74 MMHG | RESPIRATION RATE: 18 BRPM | HEIGHT: 72 IN | BODY MASS INDEX: 23.23 KG/M2 | OXYGEN SATURATION: 99 % | HEART RATE: 74 BPM | WEIGHT: 171.5 LBS | TEMPERATURE: 97.5 F

## 2025-06-25 DIAGNOSIS — R73.9 ELEVATED BLOOD SUGAR: ICD-10-CM

## 2025-06-25 DIAGNOSIS — M54.50 ACUTE LEFT-SIDED LOW BACK PAIN WITHOUT SCIATICA: Primary | ICD-10-CM

## 2025-06-25 DIAGNOSIS — R19.5 INCREASED STOOL VOLUME: ICD-10-CM

## 2025-06-25 DIAGNOSIS — M54.50 ACUTE LEFT-SIDED LOW BACK PAIN WITHOUT SCIATICA: ICD-10-CM

## 2025-06-25 DIAGNOSIS — K92.1 BLOOD IN STOOL: ICD-10-CM

## 2025-06-25 DIAGNOSIS — Z13.6 CARDIOVASCULAR SCREENING; LDL GOAL LESS THAN 160: ICD-10-CM

## 2025-06-25 LAB
ALBUMIN SERPL BCG-MCNC: 4.7 G/DL (ref 3.5–5.2)
ALBUMIN UR-MCNC: NEGATIVE MG/DL
ALP SERPL-CCNC: 63 U/L (ref 40–150)
ALT SERPL W P-5'-P-CCNC: 18 U/L (ref 0–70)
ANION GAP SERPL CALCULATED.3IONS-SCNC: 9 MMOL/L (ref 7–15)
APPEARANCE UR: CLEAR
AST SERPL W P-5'-P-CCNC: 24 U/L (ref 0–45)
BACTERIA #/AREA URNS HPF: ABNORMAL /HPF
BILIRUB SERPL-MCNC: 3.3 MG/DL
BILIRUB UR QL STRIP: NEGATIVE
BUN SERPL-MCNC: 15.2 MG/DL (ref 6–20)
CALCIUM SERPL-MCNC: 9.9 MG/DL (ref 8.8–10.4)
CHLORIDE SERPL-SCNC: 101 MMOL/L (ref 98–107)
CHOLEST SERPL-MCNC: 213 MG/DL
COLOR UR AUTO: YELLOW
CREAT SERPL-MCNC: 0.96 MG/DL (ref 0.67–1.17)
EGFRCR SERPLBLD CKD-EPI 2021: >90 ML/MIN/1.73M2
ERYTHROCYTE [DISTWIDTH] IN BLOOD BY AUTOMATED COUNT: 11.8 % (ref 10–15)
EST. AVERAGE GLUCOSE BLD GHB EST-MCNC: 97 MG/DL
FASTING STATUS PATIENT QL REPORTED: YES
FASTING STATUS PATIENT QL REPORTED: YES
GLUCOSE SERPL-MCNC: 112 MG/DL (ref 70–99)
GLUCOSE UR STRIP-MCNC: NEGATIVE MG/DL
HBA1C MFR BLD: 5 % (ref 0–5.6)
HCO3 SERPL-SCNC: 28 MMOL/L (ref 22–29)
HCT VFR BLD AUTO: 49.1 % (ref 40–53)
HDLC SERPL-MCNC: 59 MG/DL
HGB BLD-MCNC: 17.2 G/DL (ref 13.3–17.7)
HGB UR QL STRIP: ABNORMAL
KETONES UR STRIP-MCNC: NEGATIVE MG/DL
LDLC SERPL CALC-MCNC: 135 MG/DL
LEUKOCYTE ESTERASE UR QL STRIP: NEGATIVE
MCH RBC QN AUTO: 30.3 PG (ref 26.5–33)
MCHC RBC AUTO-ENTMCNC: 35 G/DL (ref 31.5–36.5)
MCV RBC AUTO: 86 FL (ref 78–100)
MUCOUS THREADS #/AREA URNS LPF: PRESENT /LPF
NITRATE UR QL: NEGATIVE
NONHDLC SERPL-MCNC: 154 MG/DL
PH UR STRIP: 5.5 [PH] (ref 5–7)
PLATELET # BLD AUTO: 230 10E3/UL (ref 150–450)
POTASSIUM SERPL-SCNC: 4.1 MMOL/L (ref 3.4–5.3)
PROT SERPL-MCNC: 7.7 G/DL (ref 6.4–8.3)
RBC # BLD AUTO: 5.68 10E6/UL (ref 4.4–5.9)
RBC #/AREA URNS AUTO: ABNORMAL /HPF
SODIUM SERPL-SCNC: 138 MMOL/L (ref 135–145)
SP GR UR STRIP: 1.02 (ref 1–1.03)
SQUAMOUS #/AREA URNS AUTO: ABNORMAL /LPF
TRIGL SERPL-MCNC: 93 MG/DL
UROBILINOGEN UR STRIP-ACNC: 0.2 E.U./DL
WBC # BLD AUTO: 7.8 10E3/UL (ref 4–11)
WBC #/AREA URNS AUTO: ABNORMAL /HPF

## 2025-06-25 PROCEDURE — 3050F LDL-C >= 130 MG/DL: CPT | Performed by: PHYSICIAN ASSISTANT

## 2025-06-25 PROCEDURE — 3044F HG A1C LEVEL LT 7.0%: CPT | Performed by: PHYSICIAN ASSISTANT

## 2025-06-25 PROCEDURE — 80061 LIPID PANEL: CPT | Performed by: PHYSICIAN ASSISTANT

## 2025-06-25 PROCEDURE — 85027 COMPLETE CBC AUTOMATED: CPT | Performed by: PHYSICIAN ASSISTANT

## 2025-06-25 PROCEDURE — 1125F AMNT PAIN NOTED PAIN PRSNT: CPT | Performed by: PHYSICIAN ASSISTANT

## 2025-06-25 PROCEDURE — 3078F DIAST BP <80 MM HG: CPT | Performed by: PHYSICIAN ASSISTANT

## 2025-06-25 PROCEDURE — 74018 RADEX ABDOMEN 1 VIEW: CPT | Mod: TC | Performed by: RADIOLOGY

## 2025-06-25 PROCEDURE — 36415 COLL VENOUS BLD VENIPUNCTURE: CPT | Performed by: PHYSICIAN ASSISTANT

## 2025-06-25 PROCEDURE — 99214 OFFICE O/P EST MOD 30 MIN: CPT | Performed by: PHYSICIAN ASSISTANT

## 2025-06-25 PROCEDURE — 3074F SYST BP LT 130 MM HG: CPT | Performed by: PHYSICIAN ASSISTANT

## 2025-06-25 PROCEDURE — 83036 HEMOGLOBIN GLYCOSYLATED A1C: CPT | Performed by: PHYSICIAN ASSISTANT

## 2025-06-25 PROCEDURE — 81001 URINALYSIS AUTO W/SCOPE: CPT | Performed by: PHYSICIAN ASSISTANT

## 2025-06-25 PROCEDURE — 80053 COMPREHEN METABOLIC PANEL: CPT | Performed by: PHYSICIAN ASSISTANT

## 2025-06-25 ASSESSMENT — PAIN SCALES - GENERAL: PAINLEVEL_OUTOF10: MODERATE PAIN (4)

## 2025-06-25 ASSESSMENT — ENCOUNTER SYMPTOMS: BACK PAIN: 1

## 2025-06-25 NOTE — PROGRESS NOTES
Assessment & Plan     Acute left-sided low back pain without sciatica  I think it is coincidental that he has had these 2 issues at the same time.  His back pain is most likely musculoskeletal in nature he will ice, heat, stretch and use over-the-counter meds as needed for pain he does have muscle relaxers at home that he may use.  If he has radicular symptoms that are more consistent and persistent he may contact me we can consider physical therapy and/or Medrol Dosepak  - CBC with platelets; Future  - UA Macroscopic with reflex to Microscopic and Culture - Lab Collect; Future  - XR Abdomen Upright Only; Future  - CBC with platelets  - UA Macroscopic with reflex to Microscopic and Culture - Lab Collect  - UA Microscopic with Reflex to Culture    Blood in stool  Single episode small amount of blood mixed in with mucus after multiple bowel movements in 1 day, yesterday.  He will alert me at once if the bleeding continues and then we need to proceed with colonoscopy.  - Comprehensive metabolic panel; Future  - XR Abdomen Upright Only; Future  - Comprehensive metabolic panel    Increased stool volume  Frequent bowel movements yesterday perhaps he did have a virus he will watch for further bleeding as above  - Comprehensive metabolic panel; Future  - Comprehensive metabolic panel    CARDIOVASCULAR SCREENING; LDL GOAL LESS THAN 160    - Lipid panel reflex to direct LDL Fasting; Future  - Lipid panel reflex to direct LDL Fasting    Elevated blood sugar  Hemoglobin A1c normal  - Hemoglobin A1c; Future  - Hemoglobin A1c    This chart documentation was completed in part with Dragon voice recognition software.  Documentation is reviewed after completion, however, some words and grammatical errors may remain.  Anaid Sevilla PA-C      Subjective   AJ is a 40 year old, presenting for the following health issues:  Back Pain and Rectal Problem        6/25/2025     7:30 AM   Additional Questions   Roomed by Aleta Bella  by Self     Back Pain     History of Present Illness       Back Pain:  He presents for follow up of back pain. Patient's back pain is a new problem.    Original cause of back pain: not sure  First noticed back pain: in the last week  Patient feels back pain: constantlyLocation of back pain:  Left lower back  Description of back pain: other  Back pain spreads: left thigh    Since patient first noticed back pain, pain is: always present, but gets better and worse  Does back pain interfere with his job:  Yes  On a scale of 1-10 (10 being the worst), patient describes pain as:  7  What makes back pain worse: certain positions   Acupuncture: not tried  Acetaminophen: not tried  Activity or exercise: not helpful  Chiropractor:  Not tried  Cold: not helpful  Heat: not helpful  Massage: not helpful  Muscle relaxants: not helpful  NSAIDS: helpful  Opioids: not tried  Physical Therapy: not tried  Rest: not helpful  Steroid Injection: not tried  Stretching: not helpful  Surgery: not tried  TENS unit: not tried  Topical pain relievers: not tried  Other healthcare providers patient is seeing for back pain: None    Reason for visit:  Back pain and blood in stool  Symptom onset:  3-7 days ago  Symptom intensity:  Moderate  Symptom progression:  Staying the same  Had these symptoms before:  No  What makes it worse:  Moving  What makes it better:  No   He is taking medications regularly.        Patient reports he works in construction, he installs rachel.  Having intermittent back pain is not anything new to him however this does seem a bit more than usual and that is associated with an episode of blood in his stool.  That is primarily why he is here today.  He does recall pulling something in his back with lifting while on vacation.  Stretches seem to help some but he cannot sit or lay down for any length of time because he will need to change position due to discomfort.  Currently his pain is a 4 out of 10 on the pain scale but  it can get extremely intense if he moves just the wrong way.  He denies any rash in the vicinity, he has had shingles before and this does not feel similar.  The pain is to his left near his spine it can radiate out from there only one day that he felt it radiate down into his left leg.  It is not currently hurting down his leg.  Yesterday, he felt bloated and and as if he may be coming down with a virus.  He had more bowel movements than usual.  Typically he has only 1 bowel movement a day.  He had a bowel movement 3 times it was looser than usual.  It was not painful at the end of his last bowel movement he had a small amount of mucus and a small amount of blood mixed within the mucus.  It was separate from the stool itself.  He had a bowel movement this morning and had no blood or mucus.  He has no family history of colon polyps, colon cancer, or inflammatory bowel disease.  He did have a flexible sigmoidoscopy in 2018:  Impression:               - Granularity in the distal rectum. Biopsied.                             Possible this represents a very mild proctitis vs                             prep related changes.                             - The examination was otherwise normal.   Recommendation:           - Discharge patient to home (ambulatory).                             - Patient has a contact number available for                             emergencies. The signs and symptoms of potential                             delayed complications were discussed with the                             patient. Return to normal activities tomorrow.                             Written discharge instructions were provided to the                             patient.                             - Use Citrucel one tablespoon PO daily indefinitely.   The biopsies that were taken revealed only mild edema no inflammation and it was thought that it was related to his bowel prep.                                                 "                                  Of note, he reports a history of hematuria.  He reports a full workup but they were never able to find anything wrong or the reason for the hematuria.    Review of Systems  Constitutional, HEENT, cardiovascular, pulmonary, gi and gu systems are negative, except as otherwise noted.      Objective    /74   Pulse 74   Temp 97.5  F (36.4  C) (Temporal)   Resp 18   Ht 1.824 m (5' 11.81\")   Wt 77.8 kg (171 lb 8 oz)   SpO2 99%   BMI 23.38 kg/m    Body mass index is 23.38 kg/m .  Physical Exam   GENERAL: alert and no distress  NECK: no adenopathy, no asymmetry, masses, or scars  RESP: lungs clear to auscultation - no rales, rhonchi or wheezes  CV: regular rate and rhythm, normal S1 S2, no S3 or S4, no murmur, click or rub, no peripheral edema  ABDOMEN: soft, nontender, no hepatosplenomegaly, no masses and bowel sounds normal  MS: no gross musculoskeletal defects noted, no edema  SKIN: no suspicious lesions or rashes  Comprehensive back pain exam:  Tenderness of minimal tenderness over the left lumbosacral musculature at about the L4 level no overlying rash no other areas of tenderness to palpation of the thoracolumbar region, Range of motion not limited by pain, Lower extremity strength functional and equal on both sides, and Lower extremity sensation normal and equal on both sides  PSYCH: mentation appears normal, affect normal/bright    Results for orders placed or performed in visit on 06/25/25   Lipid panel reflex to direct LDL Fasting     Status: Abnormal   Result Value Ref Range    Cholesterol 213 (H) <200 mg/dL    Triglycerides 93 <150 mg/dL    Direct Measure HDL 59 >=40 mg/dL    LDL Cholesterol Calculated 135 (H) <100 mg/dL    Non HDL Cholesterol 154 (H) <130 mg/dL    Patient Fasting > 8hrs? Yes     Narrative    Cholesterol  Desirable: < 200 mg/dL  Borderline High: 200 - 239 mg/dL  High: >= 240 mg/dL    Triglycerides  Normal: < 150 mg/dL  Borderline High: 150 - 199 " mg/dL  High: 200-499 mg/dL  Very High: >= 500 mg/dL    Direct Measure HDL  Female: >= 50 mg/dL   Male: >= 40 mg/dL    LDL Cholesterol  Desirable: < 100 mg/dL  Above Desirable: 100 - 129 mg/dL   Borderline High: 130 - 159 mg/dL   High:  160 - 189 mg/dL   Very High: >= 190 mg/dL    Non HDL Cholesterol  Desirable: < 130 mg/dL  Above Desirable: 130 - 159 mg/dL  Borderline High: 160 - 189 mg/dL  High: 190 - 219 mg/dL  Very High: >= 220 mg/dL   CBC with platelets     Status: Normal   Result Value Ref Range    WBC Count 7.8 4.0 - 11.0 10e3/uL    RBC Count 5.68 4.40 - 5.90 10e6/uL    Hemoglobin 17.2 13.3 - 17.7 g/dL    Hematocrit 49.1 40.0 - 53.0 %    MCV 86 78 - 100 fL    MCH 30.3 26.5 - 33.0 pg    MCHC 35.0 31.5 - 36.5 g/dL    RDW 11.8 10.0 - 15.0 %    Platelet Count 230 150 - 450 10e3/uL   Comprehensive metabolic panel     Status: Abnormal   Result Value Ref Range    Sodium 138 135 - 145 mmol/L    Potassium 4.1 3.4 - 5.3 mmol/L    Carbon Dioxide (CO2) 28 22 - 29 mmol/L    Anion Gap 9 7 - 15 mmol/L    Urea Nitrogen 15.2 6.0 - 20.0 mg/dL    Creatinine 0.96 0.67 - 1.17 mg/dL    GFR Estimate >90 >60 mL/min/1.73m2    Calcium 9.9 8.8 - 10.4 mg/dL    Chloride 101 98 - 107 mmol/L    Glucose 112 (H) 70 - 99 mg/dL    Alkaline Phosphatase 63 40 - 150 U/L    AST 24 0 - 45 U/L    ALT 18 0 - 70 U/L    Protein Total 7.7 6.4 - 8.3 g/dL    Albumin 4.7 3.5 - 5.2 g/dL    Bilirubin Total 3.3 (H) <=1.2 mg/dL    Patient Fasting > 8hrs? Yes    UA Macroscopic with reflex to Microscopic and Culture - Lab Collect     Status: Abnormal    Specimen: Urine, Clean Catch   Result Value Ref Range    Color Urine Yellow Colorless, Straw, Light Yellow, Yellow    Appearance Urine Clear Clear    Glucose Urine Negative Negative mg/dL    Bilirubin Urine Negative Negative    Ketones Urine Negative Negative mg/dL    Specific Gravity Urine 1.025 1.003 - 1.035    Blood Urine Small (A) Negative    pH Urine 5.5 5.0 - 7.0    Protein Albumin Urine Negative Negative  mg/dL    Urobilinogen Urine 0.2 0.2, 1.0 E.U./dL    Nitrite Urine Negative Negative    Leukocyte Esterase Urine Negative Negative   UA Microscopic with Reflex to Culture     Status: Abnormal   Result Value Ref Range    Bacteria Urine Moderate (A) None Seen /HPF    RBC Urine 2-5 (A) 0-2 /HPF /HPF    WBC Urine 0-5 0-5 /HPF /HPF    Squamous Epithelials Urine Few (A) None Seen /LPF    Mucus Urine Present (A) None Seen /LPF    Narrative    Urine Culture not indicated   Hemoglobin A1c     Status: Normal   Result Value Ref Range    Estimated Average Glucose 97 <117 mg/dL    Hemoglobin A1C 5.0 0.0 - 5.6 %   History of hematuria as above already fully worked up        Signed Electronically by: Anaid Sevilla PA-C

## 2025-07-13 ENCOUNTER — HEALTH MAINTENANCE LETTER (OUTPATIENT)
Age: 40
End: 2025-07-13

## (undated) DEVICE — SOL WATER IRRIG 1000ML BOTTLE 07139-09

## (undated) RX ORDER — ETHYL CHLORIDE 100 %
AEROSOL, SPRAY (ML) TOPICAL
Status: DISPENSED
Start: 2018-10-24

## (undated) RX ORDER — FENTANYL CITRATE 50 UG/ML
INJECTION, SOLUTION INTRAMUSCULAR; INTRAVENOUS
Status: DISPENSED
Start: 2018-10-24